# Patient Record
Sex: MALE | Race: WHITE | Employment: FULL TIME | URBAN - METROPOLITAN AREA
[De-identification: names, ages, dates, MRNs, and addresses within clinical notes are randomized per-mention and may not be internally consistent; named-entity substitution may affect disease eponyms.]

---

## 2018-07-13 ENCOUNTER — OFFICE VISIT (OUTPATIENT)
Dept: FAMILY MEDICINE CLINIC | Age: 48
End: 2018-07-13

## 2018-07-13 VITALS
BODY MASS INDEX: 31.06 KG/M2 | RESPIRATION RATE: 16 BRPM | HEART RATE: 78 BPM | WEIGHT: 242 LBS | OXYGEN SATURATION: 97 % | SYSTOLIC BLOOD PRESSURE: 184 MMHG | DIASTOLIC BLOOD PRESSURE: 120 MMHG | HEIGHT: 74 IN

## 2018-07-13 DIAGNOSIS — Z00.00 ANNUAL PHYSICAL EXAM: Primary | ICD-10-CM

## 2018-07-13 DIAGNOSIS — I10 ESSENTIAL HYPERTENSION: Primary | ICD-10-CM

## 2018-07-13 PROCEDURE — 99203 OFFICE O/P NEW LOW 30 MIN: CPT | Performed by: FAMILY MEDICINE

## 2018-07-13 RX ORDER — PREDNISOLONE ACETATE 10 MG/ML
SUSPENSION/ DROPS OPHTHALMIC
COMMUNITY
Start: 2018-06-27 | End: 2019-01-16 | Stop reason: ALTCHOICE

## 2018-07-13 RX ORDER — LISINOPRIL 10 MG/1
10 TABLET ORAL DAILY
Qty: 30 TABLET | Refills: 3 | Status: SHIPPED | OUTPATIENT
Start: 2018-07-13 | End: 2018-09-14 | Stop reason: DRUGHIGH

## 2018-07-13 RX ORDER — BLOOD PRESSURE TEST KIT
1 KIT MISCELLANEOUS 2 TIMES DAILY
Qty: 1 KIT | Refills: 0 | Status: SHIPPED | OUTPATIENT
Start: 2018-07-13 | End: 2018-11-09

## 2018-07-13 ASSESSMENT — PATIENT HEALTH QUESTIONNAIRE - PHQ9
SUM OF ALL RESPONSES TO PHQ9 QUESTIONS 1 & 2: 0
2. FEELING DOWN, DEPRESSED OR HOPELESS: 0
SUM OF ALL RESPONSES TO PHQ QUESTIONS 1-9: 0
1. LITTLE INTEREST OR PLEASURE IN DOING THINGS: 0

## 2018-07-13 NOTE — PROGRESS NOTES
m)   Wt 242 lb (109.8 kg)   SpO2 97%   BMI 31.07 kg/m²     Physical Exam   Constitutional: He is oriented to person, place, and time. He appears well-developed and well-nourished. HENT:   Head: Normocephalic and atraumatic. Eyes: EOM are normal. Pupils are equal, round, and reactive to light. Neck: Normal range of motion. Cardiovascular: Normal rate, regular rhythm and normal heart sounds. Pulmonary/Chest: Effort normal and breath sounds normal.   Abdominal: Bowel sounds are normal. There is no tenderness. Neurological: He is alert and oriented to person, place, and time. Psychiatric: He has a normal mood and affect. His behavior is normal. Judgment and thought content normal.       Plan   Diagnosis Orders   1. Essential hypertension  Blood Pressure KIT    lisinopril (PRINIVIL;ZESTRIL) 10 MG tablet       Return in about 2 months (around 9/13/2018) for Physical Exam.    Pt to record BP twice a day to a log to bring to the office in 2 weeks with his name it. Prior to Visit Medications    Medication Sig Taking?  Authorizing Provider   PRED FORTE 1 % ophthalmic suspension  Yes Historical Provider, MD   Blood Pressure KIT 1 kit by Does not apply route 2 times daily Yes Bruno Thornton,    lisinopril (PRINIVIL;ZESTRIL) 10 MG tablet Take 1 tablet by mouth daily Yes Bruno Thornton, DO

## 2018-08-10 DIAGNOSIS — Z00.00 ANNUAL PHYSICAL EXAM: ICD-10-CM

## 2018-08-10 LAB
A/G RATIO: 2 (ref 1.1–2.2)
ALBUMIN SERPL-MCNC: 4.7 G/DL (ref 3.4–5)
ALP BLD-CCNC: 64 U/L (ref 40–129)
ALT SERPL-CCNC: 45 U/L (ref 10–40)
ANION GAP SERPL CALCULATED.3IONS-SCNC: 11 MMOL/L (ref 3–16)
AST SERPL-CCNC: 37 U/L (ref 15–37)
BASOPHILS ABSOLUTE: 0 K/UL (ref 0–0.2)
BASOPHILS RELATIVE PERCENT: 0.6 %
BILIRUB SERPL-MCNC: 1.2 MG/DL (ref 0–1)
BUN BLDV-MCNC: 12 MG/DL (ref 7–20)
CALCIUM SERPL-MCNC: 9.5 MG/DL (ref 8.3–10.6)
CHLORIDE BLD-SCNC: 104 MMOL/L (ref 99–110)
CHOLESTEROL, TOTAL: 198 MG/DL (ref 0–199)
CO2: 26 MMOL/L (ref 21–32)
CREAT SERPL-MCNC: 1.2 MG/DL (ref 0.9–1.3)
EOSINOPHILS ABSOLUTE: 0.2 K/UL (ref 0–0.6)
EOSINOPHILS RELATIVE PERCENT: 3.5 %
GFR AFRICAN AMERICAN: >60
GFR NON-AFRICAN AMERICAN: >60
GLOBULIN: 2.4 G/DL
GLUCOSE BLD-MCNC: 86 MG/DL (ref 70–99)
HCT VFR BLD CALC: 45 % (ref 40.5–52.5)
HDLC SERPL-MCNC: 37 MG/DL (ref 40–60)
HEMOGLOBIN: 15.2 G/DL (ref 13.5–17.5)
LDL CHOLESTEROL CALCULATED: 119 MG/DL
LYMPHOCYTES ABSOLUTE: 1.4 K/UL (ref 1–5.1)
LYMPHOCYTES RELATIVE PERCENT: 28.5 %
MAGNESIUM: 2.1 MG/DL (ref 1.8–2.4)
MCH RBC QN AUTO: 32.1 PG (ref 26–34)
MCHC RBC AUTO-ENTMCNC: 33.8 G/DL (ref 31–36)
MCV RBC AUTO: 94.9 FL (ref 80–100)
MONOCYTES ABSOLUTE: 0.3 K/UL (ref 0–1.3)
MONOCYTES RELATIVE PERCENT: 7.1 %
NEUTROPHILS ABSOLUTE: 3 K/UL (ref 1.7–7.7)
NEUTROPHILS RELATIVE PERCENT: 60.3 %
PDW BLD-RTO: 13.4 % (ref 12.4–15.4)
PLATELET # BLD: 199 K/UL (ref 135–450)
PMV BLD AUTO: 8.7 FL (ref 5–10.5)
POTASSIUM SERPL-SCNC: 4.8 MMOL/L (ref 3.5–5.1)
RBC # BLD: 4.74 M/UL (ref 4.2–5.9)
SODIUM BLD-SCNC: 141 MMOL/L (ref 136–145)
T3 FREE: 3.4 PG/ML (ref 2.3–4.2)
T4 FREE: 1.3 NG/DL (ref 0.9–1.8)
TOTAL PROTEIN: 7.1 G/DL (ref 6.4–8.2)
TRIGL SERPL-MCNC: 210 MG/DL (ref 0–150)
TSH SERPL DL<=0.05 MIU/L-ACNC: 1 UIU/ML (ref 0.27–4.2)
VITAMIN D 25-HYDROXY: 22.3 NG/ML
VLDLC SERPL CALC-MCNC: 42 MG/DL
WBC # BLD: 4.9 K/UL (ref 4–11)

## 2018-08-30 DIAGNOSIS — E55.9 VITAMIN D DEFICIENCY: Primary | ICD-10-CM

## 2018-08-30 DIAGNOSIS — E78.00 ELEVATED LDL CHOLESTEROL LEVEL: ICD-10-CM

## 2018-08-30 DIAGNOSIS — R74.01 ELEVATED ALT MEASUREMENT: ICD-10-CM

## 2018-08-30 RX ORDER — ERGOCALCIFEROL (VITAMIN D2) 1250 MCG
50000 CAPSULE ORAL WEEKLY
Qty: 4 CAPSULE | Refills: 2 | Status: SHIPPED | OUTPATIENT
Start: 2018-08-30 | End: 2019-01-16 | Stop reason: ALTCHOICE

## 2018-09-14 ENCOUNTER — OFFICE VISIT (OUTPATIENT)
Dept: FAMILY MEDICINE CLINIC | Age: 48
End: 2018-09-14

## 2018-09-14 VITALS
SYSTOLIC BLOOD PRESSURE: 168 MMHG | OXYGEN SATURATION: 97 % | WEIGHT: 251 LBS | BODY MASS INDEX: 32.21 KG/M2 | HEIGHT: 74 IN | HEART RATE: 88 BPM | RESPIRATION RATE: 18 BRPM | DIASTOLIC BLOOD PRESSURE: 118 MMHG

## 2018-09-14 DIAGNOSIS — D22.9 NEVUS: ICD-10-CM

## 2018-09-14 DIAGNOSIS — R74.01 ELEVATED ALT MEASUREMENT: ICD-10-CM

## 2018-09-14 DIAGNOSIS — M54.41 CHRONIC BILATERAL LOW BACK PAIN WITH BILATERAL SCIATICA: ICD-10-CM

## 2018-09-14 DIAGNOSIS — E55.9 VITAMIN D DEFICIENCY: ICD-10-CM

## 2018-09-14 DIAGNOSIS — G89.29 CHRONIC BILATERAL LOW BACK PAIN WITH BILATERAL SCIATICA: ICD-10-CM

## 2018-09-14 DIAGNOSIS — I10 ESSENTIAL HYPERTENSION: ICD-10-CM

## 2018-09-14 DIAGNOSIS — M54.42 CHRONIC BILATERAL LOW BACK PAIN WITH BILATERAL SCIATICA: ICD-10-CM

## 2018-09-14 DIAGNOSIS — Z00.00 ANNUAL PHYSICAL EXAM: Primary | ICD-10-CM

## 2018-09-14 PROCEDURE — 99396 PREV VISIT EST AGE 40-64: CPT | Performed by: FAMILY MEDICINE

## 2018-09-14 RX ORDER — LISINOPRIL 30 MG/1
30 TABLET ORAL DAILY
Qty: 30 TABLET | Refills: 3 | Status: SHIPPED | OUTPATIENT
Start: 2018-09-14 | End: 2018-11-09

## 2018-09-14 ASSESSMENT — ENCOUNTER SYMPTOMS
VOMITING: 0
EYE REDNESS: 0
DIARRHEA: 0
EYE PAIN: 0
SHORTNESS OF BREATH: 0
NAUSEA: 0
RHINORRHEA: 0
SORE THROAT: 0
APNEA: 0
COUGH: 0
COLOR CHANGE: 0
BACK PAIN: 1

## 2018-09-14 ASSESSMENT — PATIENT HEALTH QUESTIONNAIRE - PHQ9
SUM OF ALL RESPONSES TO PHQ QUESTIONS 1-9: 0
SUM OF ALL RESPONSES TO PHQ9 QUESTIONS 1 & 2: 0
SUM OF ALL RESPONSES TO PHQ QUESTIONS 1-9: 0
1. LITTLE INTEREST OR PLEASURE IN DOING THINGS: 0
2. FEELING DOWN, DEPRESSED OR HOPELESS: 0

## 2018-09-14 NOTE — PROGRESS NOTES
Cara Smith  YOB: 1970    Date of Service:  9/14/2018    Chief Complaint:   Cara Smith is a 57 y.o. male who presents for complete physical examination. HPI:  HPI  Average BP outside of the office is 150/110, has stopped adding salt to his foos and has decreased soda significantly. Has been travelling and eating out. Self-testicular exams: Yes  Sexual activity: has sex with females   Last eye exam: 1 month ago, abnormal - iritis, decreased right eye vision  Exercise: walks 7 time(s) per week  Seatbelt use: yes  Are You a spiritual person: Yes  Living will: yes    Wt Readings from Last 3 Encounters:   09/14/18 251 lb (113.9 kg)   07/13/18 242 lb (109.8 kg)     BP Readings from Last 3 Encounters:   09/14/18 (!) 168/118   07/13/18 (!) 184/120       Patient Active Problem List   Diagnosis    Essential hypertension    Vitamin D deficiency       Health Maintenance   Topic Date Due    HIV screen  07/09/1987    DTaP/Tdap/Td vaccine (1 - Tdap) 07/09/1991    Flu vaccine (1) 09/01/2018    Potassium monitoring  08/10/2019    Creatinine monitoring  08/10/2019    Lipid screen  08/10/2023         There is no immunization history on file for this patient.     Allergies   Allergen Reactions    No Known Allergies      Outpatient Prescriptions Marked as Taking for the 9/14/18 encounter (Office Visit) with Torin Yan DO   Medication Sig Dispense Refill    lisinopril (PRINIVIL;ZESTRIL) 30 MG tablet Take 1 tablet by mouth daily 30 tablet 3    ergocalciferol (DRISDOL) 33597 units capsule Take 1 capsule by mouth once a week for 12 doses 4 capsule 2    Blood Pressure KIT 1 kit by Does not apply route 2 times daily 1 kit 0       Past Medical History:   Diagnosis Date    Allergic rhinitis     Chronic back pain     Hypertension     Osteoarthritis      Past Surgical History:   Procedure Laterality Date    EYE SURGERY      VASECTOMY  2013     Family History   Problem Relation Age of Onset    High Blood Pressure Mother     Other Father      Social History     Social History    Marital status:      Spouse name: N/A    Number of children: N/A    Years of education: N/A     Occupational History    Not on file. Social History Main Topics    Smoking status: Former Smoker     Packs/day: 1.00     Years: 20.00     Types: Cigarettes     Quit date: 2005    Smokeless tobacco: Never Used    Alcohol use Yes      Comment: 10 beers    Drug use: No    Sexual activity: Yes     Partners: Female     Other Topics Concern    Not on file     Social History Narrative    No narrative on file       Review of Systems:  Review of Systems   Constitutional: Positive for fatigue. Negative for activity change and appetite change. HENT: Positive for congestion. Negative for rhinorrhea and sore throat. Eyes: Negative for pain and redness. Respiratory: Negative for apnea, cough and shortness of breath. Cardiovascular: Negative for chest pain and palpitations. Gastrointestinal: Negative for diarrhea (last week - resolved), nausea and vomiting. Endocrine: Negative for cold intolerance and heat intolerance. Genitourinary: Negative for difficulty urinating, frequency and hematuria. Musculoskeletal: Positive for arthralgias, back pain, myalgias and neck pain. Skin: Negative for color change and rash. Allergic/Immunologic: Positive for environmental allergies. Neurological: Negative for dizziness and headaches. Hematological: Negative for adenopathy. Does not bruise/bleed easily. Psychiatric/Behavioral: Negative for dysphoric mood and sleep disturbance. The patient is not nervous/anxious.         Physical Exam:   Vitals:    09/14/18 0840 09/14/18 0853   BP: (!) 188/120 (!) 168/118   Site: Right Upper Arm Left Upper Arm   Position: Sitting Sitting   Cuff Size: Medium Adult Medium Adult   Pulse: 88    Resp: 18    SpO2: 97%    Weight: 251 lb (113.9 kg)    Height: 6' 2\" (1.88 m)      Body mass index is

## 2018-11-09 ENCOUNTER — OFFICE VISIT (OUTPATIENT)
Dept: FAMILY MEDICINE CLINIC | Age: 48
End: 2018-11-09
Payer: COMMERCIAL

## 2018-11-09 VITALS
DIASTOLIC BLOOD PRESSURE: 110 MMHG | OXYGEN SATURATION: 98 % | HEART RATE: 88 BPM | RESPIRATION RATE: 16 BRPM | WEIGHT: 259 LBS | SYSTOLIC BLOOD PRESSURE: 168 MMHG | TEMPERATURE: 98.1 F | HEIGHT: 74 IN | BODY MASS INDEX: 33.24 KG/M2

## 2018-11-09 DIAGNOSIS — I10 ESSENTIAL HYPERTENSION: Primary | ICD-10-CM

## 2018-11-09 PROCEDURE — 99213 OFFICE O/P EST LOW 20 MIN: CPT | Performed by: FAMILY MEDICINE

## 2018-11-09 RX ORDER — POTASSIUM CHLORIDE 750 MG/1
10 TABLET, FILM COATED, EXTENDED RELEASE ORAL DAILY
Qty: 60 TABLET | Refills: 3 | Status: SHIPPED | OUTPATIENT
Start: 2018-11-09 | End: 2018-12-14 | Stop reason: SDUPTHER

## 2018-11-09 RX ORDER — LISINOPRIL AND HYDROCHLOROTHIAZIDE 20; 12.5 MG/1; MG/1
1 TABLET ORAL DAILY
Qty: 30 TABLET | Refills: 3 | Status: SHIPPED | OUTPATIENT
Start: 2018-11-09 | End: 2018-12-14 | Stop reason: DRUGHIGH

## 2018-11-09 ASSESSMENT — PATIENT HEALTH QUESTIONNAIRE - PHQ9
2. FEELING DOWN, DEPRESSED OR HOPELESS: 0
SUM OF ALL RESPONSES TO PHQ9 QUESTIONS 1 & 2: 0
1. LITTLE INTEREST OR PLEASURE IN DOING THINGS: 0
SUM OF ALL RESPONSES TO PHQ QUESTIONS 1-9: 0
SUM OF ALL RESPONSES TO PHQ QUESTIONS 1-9: 0

## 2018-12-14 ENCOUNTER — OFFICE VISIT (OUTPATIENT)
Dept: FAMILY MEDICINE CLINIC | Age: 48
End: 2018-12-14
Payer: COMMERCIAL

## 2018-12-14 VITALS
HEART RATE: 77 BPM | DIASTOLIC BLOOD PRESSURE: 104 MMHG | SYSTOLIC BLOOD PRESSURE: 144 MMHG | HEIGHT: 74 IN | BODY MASS INDEX: 33.24 KG/M2 | WEIGHT: 259 LBS | OXYGEN SATURATION: 98 %

## 2018-12-14 DIAGNOSIS — R25.2 LEG CRAMPING: ICD-10-CM

## 2018-12-14 DIAGNOSIS — I10 ESSENTIAL HYPERTENSION: Primary | ICD-10-CM

## 2018-12-14 PROCEDURE — 99213 OFFICE O/P EST LOW 20 MIN: CPT | Performed by: FAMILY MEDICINE

## 2018-12-14 RX ORDER — LISINOPRIL AND HYDROCHLOROTHIAZIDE 25; 20 MG/1; MG/1
1 TABLET ORAL DAILY
Qty: 30 TABLET | Refills: 0 | Status: SHIPPED | OUTPATIENT
Start: 2018-12-14 | End: 2019-01-16 | Stop reason: SDUPTHER

## 2018-12-14 RX ORDER — POTASSIUM CHLORIDE 750 MG/1
10 TABLET, FILM COATED, EXTENDED RELEASE ORAL 2 TIMES DAILY
Qty: 60 TABLET | Refills: 3 | Status: SHIPPED | OUTPATIENT
Start: 2018-12-14 | End: 2019-01-16 | Stop reason: SDUPTHER

## 2018-12-14 NOTE — PROGRESS NOTES
Component Date Value Ref Range Status    TSH 08/10/2018 1.00  0.27 - 4.20 uIU/mL Final    T4 Free 08/10/2018 1.3  0.9 - 1.8 ng/dL Final    T3, Free 08/10/2018 3.4  2.3 - 4.2 pg/mL Final    Cholesterol, Total 08/10/2018 198  0 - 199 mg/dL Final    Triglycerides 08/10/2018 210* 0 - 150 mg/dL Final    HDL 08/10/2018 37* 40 - 60 mg/dL Final    LDL Calculated 08/10/2018 119* <100 mg/dL Final    VLDL Cholesterol Calculated 08/10/2018 42  Not Established mg/dL Final    WBC 08/10/2018 4.9  4.0 - 11.0 K/uL Final    RBC 08/10/2018 4.74  4.20 - 5.90 M/uL Final    Hemoglobin 08/10/2018 15.2  13.5 - 17.5 g/dL Final    Hematocrit 08/10/2018 45.0  40.5 - 52.5 % Final    MCV 08/10/2018 94.9  80.0 - 100.0 fL Final    MCH 08/10/2018 32.1  26.0 - 34.0 pg Final    MCHC 08/10/2018 33.8  31.0 - 36.0 g/dL Final    RDW 08/10/2018 13.4  12.4 - 15.4 % Final    Platelets 17/43/6818 199  135 - 450 K/uL Final    MPV 08/10/2018 8.7  5.0 - 10.5 fL Final    Neutrophils % 08/10/2018 60.3  % Final    Lymphocytes % 08/10/2018 28.5  % Final    Monocytes % 08/10/2018 7.1  % Final    Eosinophils % 08/10/2018 3.5  % Final    Basophils % 08/10/2018 0.6  % Final    Neutrophils # 08/10/2018 3.0  1.7 - 7.7 K/uL Final    Lymphocytes # 08/10/2018 1.4  1.0 - 5.1 K/uL Final    Monocytes # 08/10/2018 0.3  0.0 - 1.3 K/uL Final    Eosinophils # 08/10/2018 0.2  0.0 - 0.6 K/uL Final    Basophils # 08/10/2018 0.0  0.0 - 0.2 K/uL Final    Sodium 08/10/2018 141  136 - 145 mmol/L Final    Potassium 08/10/2018 4.8  3.5 - 5.1 mmol/L Final    Chloride 08/10/2018 104  99 - 110 mmol/L Final    CO2 08/10/2018 26  21 - 32 mmol/L Final    Anion Gap 08/10/2018 11  3 - 16 Final    Glucose 08/10/2018 86  70 - 99 mg/dL Final    BUN 08/10/2018 12  7 - 20 mg/dL Final    CREATININE 08/10/2018 1.2  0.9 - 1.3 mg/dL Final    GFR Non- 08/10/2018 >60  >60 Final    Comment: >60 mL/min/1.73m2 EGFR, calc. for ages 25 and older using hypertension  potassium chloride (KLOR-CON 10) 10 MEQ extended release tablet    lisinopril-hydrochlorothiazide (PRINZIDE;ZESTORETIC) 20-25 MG per tablet    Potassium   2. Leg cramping  Increase potassium to 20 meq       Return in about 1 month (around 1/14/2019) for HTN F/U. Prior to Visit Medications    Medication Sig Taking?  Authorizing Provider   potassium chloride (KLOR-CON 10) 10 MEQ extended release tablet Take 1 tablet by mouth 2 times daily Yes Lisa Mon DO   lisinopril-hydrochlorothiazide (PRINZIDE;ZESTORETIC) 20-25 MG per tablet Take 1 tablet by mouth daily Yes Lisa Mon,    PRED FORTE 1 % ophthalmic suspension  Yes Historical Provider, MD   ergocalciferol (DRISDOL) 62544 units capsule Take 1 capsule by mouth once a week for 12 doses  Lisa Mon,

## 2019-01-16 ENCOUNTER — OFFICE VISIT (OUTPATIENT)
Dept: FAMILY MEDICINE CLINIC | Age: 49
End: 2019-01-16
Payer: COMMERCIAL

## 2019-01-16 VITALS
OXYGEN SATURATION: 97 % | DIASTOLIC BLOOD PRESSURE: 100 MMHG | WEIGHT: 255 LBS | RESPIRATION RATE: 16 BRPM | BODY MASS INDEX: 32.73 KG/M2 | SYSTOLIC BLOOD PRESSURE: 132 MMHG | HEIGHT: 74 IN | HEART RATE: 85 BPM

## 2019-01-16 DIAGNOSIS — I10 ESSENTIAL HYPERTENSION: Primary | ICD-10-CM

## 2019-01-16 DIAGNOSIS — I10 ESSENTIAL HYPERTENSION: ICD-10-CM

## 2019-01-16 DIAGNOSIS — R20.0 BILATERAL FINGER NUMBNESS: ICD-10-CM

## 2019-01-16 PROCEDURE — 99213 OFFICE O/P EST LOW 20 MIN: CPT | Performed by: FAMILY MEDICINE

## 2019-01-16 RX ORDER — AMLODIPINE BESYLATE 5 MG/1
5 TABLET ORAL DAILY
Qty: 30 TABLET | Refills: 0 | Status: SHIPPED | OUTPATIENT
Start: 2019-01-16 | End: 2019-02-16 | Stop reason: SDUPTHER

## 2019-01-16 RX ORDER — LISINOPRIL AND HYDROCHLOROTHIAZIDE 25; 20 MG/1; MG/1
1 TABLET ORAL DAILY
Qty: 30 TABLET | Refills: 1 | Status: SHIPPED | OUTPATIENT
Start: 2019-01-16 | End: 2019-03-21 | Stop reason: SDUPTHER

## 2019-01-16 RX ORDER — POTASSIUM CHLORIDE 750 MG/1
10 TABLET, FILM COATED, EXTENDED RELEASE ORAL 2 TIMES DAILY
Qty: 60 TABLET | Refills: 3 | Status: SHIPPED | OUTPATIENT
Start: 2019-01-16 | End: 2019-07-29 | Stop reason: SDUPTHER

## 2019-01-16 ASSESSMENT — PATIENT HEALTH QUESTIONNAIRE - PHQ9
SUM OF ALL RESPONSES TO PHQ9 QUESTIONS 1 & 2: 0
SUM OF ALL RESPONSES TO PHQ QUESTIONS 1-9: 0
2. FEELING DOWN, DEPRESSED OR HOPELESS: 0
SUM OF ALL RESPONSES TO PHQ QUESTIONS 1-9: 0
1. LITTLE INTEREST OR PLEASURE IN DOING THINGS: 0

## 2019-02-20 ENCOUNTER — OFFICE VISIT (OUTPATIENT)
Dept: FAMILY MEDICINE CLINIC | Age: 49
End: 2019-02-20
Payer: COMMERCIAL

## 2019-02-20 VITALS
HEART RATE: 83 BPM | OXYGEN SATURATION: 96 % | WEIGHT: 254 LBS | SYSTOLIC BLOOD PRESSURE: 138 MMHG | RESPIRATION RATE: 14 BRPM | HEIGHT: 74 IN | BODY MASS INDEX: 32.6 KG/M2 | DIASTOLIC BLOOD PRESSURE: 86 MMHG

## 2019-02-20 DIAGNOSIS — I10 ESSENTIAL HYPERTENSION: Primary | ICD-10-CM

## 2019-02-20 DIAGNOSIS — R20.0 BILATERAL FINGER NUMBNESS: ICD-10-CM

## 2019-02-20 DIAGNOSIS — I10 ESSENTIAL HYPERTENSION: ICD-10-CM

## 2019-02-20 LAB
ANION GAP SERPL CALCULATED.3IONS-SCNC: 12 MMOL/L (ref 3–16)
BUN BLDV-MCNC: 17 MG/DL (ref 7–20)
CALCIUM SERPL-MCNC: 10.1 MG/DL (ref 8.3–10.6)
CHLORIDE BLD-SCNC: 100 MMOL/L (ref 99–110)
CO2: 29 MMOL/L (ref 21–32)
CREAT SERPL-MCNC: 1.1 MG/DL (ref 0.9–1.3)
GFR AFRICAN AMERICAN: >60
GFR NON-AFRICAN AMERICAN: >60
GLUCOSE BLD-MCNC: 87 MG/DL (ref 70–99)
POTASSIUM SERPL-SCNC: 4.5 MMOL/L (ref 3.5–5.1)
SODIUM BLD-SCNC: 141 MMOL/L (ref 136–145)

## 2019-02-20 PROCEDURE — 99213 OFFICE O/P EST LOW 20 MIN: CPT | Performed by: FAMILY MEDICINE

## 2019-02-20 ASSESSMENT — PATIENT HEALTH QUESTIONNAIRE - PHQ9
SUM OF ALL RESPONSES TO PHQ9 QUESTIONS 1 & 2: 0
SUM OF ALL RESPONSES TO PHQ QUESTIONS 1-9: 0
SUM OF ALL RESPONSES TO PHQ QUESTIONS 1-9: 0
1. LITTLE INTEREST OR PLEASURE IN DOING THINGS: 0
2. FEELING DOWN, DEPRESSED OR HOPELESS: 0

## 2019-02-20 ASSESSMENT — ENCOUNTER SYMPTOMS: SHORTNESS OF BREATH: 0

## 2019-05-21 ENCOUNTER — OFFICE VISIT (OUTPATIENT)
Dept: FAMILY MEDICINE CLINIC | Age: 49
End: 2019-05-21
Payer: COMMERCIAL

## 2019-05-21 VITALS
DIASTOLIC BLOOD PRESSURE: 106 MMHG | BODY MASS INDEX: 32.6 KG/M2 | TEMPERATURE: 97.7 F | OXYGEN SATURATION: 97 % | HEIGHT: 74 IN | RESPIRATION RATE: 16 BRPM | WEIGHT: 254 LBS | HEART RATE: 73 BPM | SYSTOLIC BLOOD PRESSURE: 146 MMHG

## 2019-05-21 DIAGNOSIS — I10 ESSENTIAL HYPERTENSION: Primary | ICD-10-CM

## 2019-05-21 DIAGNOSIS — Z23 NEED FOR PROPHYLACTIC VACCINATION AGAINST DIPHTHERIA-TETANUS-PERTUSSIS (DTP): ICD-10-CM

## 2019-05-21 PROCEDURE — 90715 TDAP VACCINE 7 YRS/> IM: CPT | Performed by: FAMILY MEDICINE

## 2019-05-21 PROCEDURE — 90471 IMMUNIZATION ADMIN: CPT | Performed by: FAMILY MEDICINE

## 2019-05-21 PROCEDURE — 99213 OFFICE O/P EST LOW 20 MIN: CPT | Performed by: FAMILY MEDICINE

## 2019-05-21 RX ORDER — AMLODIPINE BESYLATE 10 MG/1
10 TABLET ORAL DAILY
Qty: 30 TABLET | Refills: 2 | Status: SHIPPED | OUTPATIENT
Start: 2019-05-21 | End: 2019-08-20 | Stop reason: DRUGHIGH

## 2019-05-21 ASSESSMENT — PATIENT HEALTH QUESTIONNAIRE - PHQ9
SUM OF ALL RESPONSES TO PHQ9 QUESTIONS 1 & 2: 0
SUM OF ALL RESPONSES TO PHQ QUESTIONS 1-9: 0
1. LITTLE INTEREST OR PLEASURE IN DOING THINGS: 0
SUM OF ALL RESPONSES TO PHQ QUESTIONS 1-9: 0
2. FEELING DOWN, DEPRESSED OR HOPELESS: 0

## 2019-05-21 NOTE — PROGRESS NOTES
2019    This is a 57 y.o. male   Chief Complaint   Patient presents with    Hypertension     F/U   .    HPI  Pt presents today for a HTN F/U. He is currently taking lisinopril hydrochlorothiazide 20-12.5 mg as well as Amlodipine 5 mg. States that his BP readings at home have been steadily increasing, average 135/95. Weight still 254, states that he has been more active. Still having periods of dizziness if he gets up too fast.  Denies leg cramps after starting a multivitamin.    Past Medical History:   Diagnosis Date    Allergic rhinitis     Chronic back pain     Hypertension     Osteoarthritis        Past Surgical History:   Procedure Laterality Date    EYE SURGERY      VASECTOMY  2013       Social History     Socioeconomic History    Marital status:      Spouse name: Not on file    Number of children: Not on file    Years of education: Not on file    Highest education level: Not on file   Occupational History    Not on file   Social Needs    Financial resource strain: Not on file    Food insecurity:     Worry: Not on file     Inability: Not on file    Transportation needs:     Medical: Not on file     Non-medical: Not on file   Tobacco Use    Smoking status: Former Smoker     Packs/day: 1.00     Years: 20.00     Pack years: 20.00     Types: Cigarettes     Last attempt to quit:      Years since quittin.3    Smokeless tobacco: Never Used   Substance and Sexual Activity    Alcohol use: Yes     Comment: 10 beers    Drug use: No    Sexual activity: Yes     Partners: Female   Lifestyle    Physical activity:     Days per week: Not on file     Minutes per session: Not on file    Stress: Not on file   Relationships    Social connections:     Talks on phone: Not on file     Gets together: Not on file     Attends Restorationist service: Not on file     Active member of club or organization: Not on file     Attends meetings of clubs or organizations: Not on file     Relationship status: Not on file    Intimate partner violence:     Fear of current or ex partner: Not on file     Emotionally abused: Not on file     Physically abused: Not on file     Forced sexual activity: Not on file   Other Topics Concern    Not on file   Social History Narrative    Not on file       Family History   Problem Relation Age of Onset    High Blood Pressure Mother     Other Father        Current Outpatient Medications   Medication Sig Dispense Refill    amLODIPine (NORVASC) 10 MG tablet Take 1 tablet by mouth daily 30 tablet 2    lisinopril-hydrochlorothiazide (PRINZIDE;ZESTORETIC) 20-25 MG per tablet TAKE 1 TABLET BY MOUTH DAILY 30 tablet 3    potassium chloride (KLOR-CON 10) 10 MEQ extended release tablet Take 1 tablet by mouth 2 times daily 60 tablet 3     No current facility-administered medications for this visit. There is no immunization history for the selected administration types on file for this patient. Allergies   Allergen Reactions    No Known Allergies        Orders Only on 02/20/2019   Component Date Value Ref Range Status    Sodium 02/20/2019 141  136 - 145 mmol/L Final    Potassium 02/20/2019 4.5  3.5 - 5.1 mmol/L Final    Chloride 02/20/2019 100  99 - 110 mmol/L Final    CO2 02/20/2019 29  21 - 32 mmol/L Final    Anion Gap 02/20/2019 12  3 - 16 Final    Glucose 02/20/2019 87  70 - 99 mg/dL Final    BUN 02/20/2019 17  7 - 20 mg/dL Final    CREATININE 02/20/2019 1.1  0.9 - 1.3 mg/dL Final    GFR Non- 02/20/2019 >60  >60 Final    Comment: >60 mL/min/1.73m2 EGFR, calc. for ages 25 and older using the  MDRD formula (not corrected for weight), is valid for stable  renal function.  GFR  02/20/2019 >60  >60 Final    Comment: Chronic Kidney Disease: less than 60 ml/min/1.73 sq.m. Kidney Failure: less than 15 ml/min/1.73 sq.m. Results valid for patients 18 years and older.       Calcium 02/20/2019 10.1  8.3 - 10.6 mg/dL Final Review of Systems   Neurological: Positive for dizziness (when standing too fast). Negative leg cramping       BP (!) 146/106 (Site: Right Upper Arm, Position: Sitting, Cuff Size: Medium Adult)   Pulse 73   Temp 97.7 °F (36.5 °C) (Oral)   Resp 16   Ht 6' 2\" (1.88 m)   Wt 254 lb (115.2 kg)   SpO2 97%   BMI 32.61 kg/m²     Physical Exam   Constitutional: He is oriented to person, place, and time. He appears well-developed and well-nourished. HENT:   Head: Normocephalic and atraumatic. Eyes: Pupils are equal, round, and reactive to light. EOM are normal.   Neck: Normal range of motion. Cardiovascular: Normal rate, regular rhythm and normal heart sounds. Pulmonary/Chest: Effort normal and breath sounds normal.   Abdominal: Bowel sounds are normal. There is no tenderness. Neurological: He is alert and oriented to person, place, and time. Psychiatric: He has a normal mood and affect. His behavior is normal. Judgment and thought content normal.   Vitals reviewed. Plan   Diagnosis Orders   1. Essential hypertension  Increased Amlodipine to 10 mg   2. Need for prophylactic vaccination against diphtheria-tetanus-pertussis (DTP)  Tdap (age 6y and older) IM (Boostrix)     Pt will check BP BID and send in log via [facility] in 2 weeks. Return in about 3 months (around 8/21/2019) for HTN F/U. Prior to Visit Medications    Medication Sig Taking?  Authorizing Provider   amLODIPine (NORVASC) 10 MG tablet Take 1 tablet by mouth daily Yes Theola Free, DO   lisinopril-hydrochlorothiazide (PRINZIDE;ZESTORETIC) 20-25 MG per tablet TAKE 1 TABLET BY MOUTH DAILY Yes Theola Free, DO   potassium chloride (KLOR-CON 10) 10 MEQ extended release tablet Take 1 tablet by mouth 2 times daily Yes Theola Free, DO

## 2019-07-26 DIAGNOSIS — I10 ESSENTIAL HYPERTENSION: ICD-10-CM

## 2019-07-29 RX ORDER — POTASSIUM CHLORIDE 750 MG/1
10 TABLET, FILM COATED, EXTENDED RELEASE ORAL DAILY
Qty: 60 TABLET | Refills: 0 | Status: SHIPPED | OUTPATIENT
Start: 2019-07-29 | End: 2019-11-03 | Stop reason: SDUPTHER

## 2019-07-29 RX ORDER — LISINOPRIL AND HYDROCHLOROTHIAZIDE 25; 20 MG/1; MG/1
1 TABLET ORAL DAILY
Qty: 30 TABLET | Refills: 0 | Status: SHIPPED | OUTPATIENT
Start: 2019-07-29 | End: 2019-08-28 | Stop reason: SDUPTHER

## 2019-07-29 NOTE — TELEPHONE ENCOUNTER
Please let patient know that I refilled his medication and that I would like for him to have the labs done that were ordered in September done before his next visit. Thank you!

## 2019-08-20 ENCOUNTER — OFFICE VISIT (OUTPATIENT)
Dept: FAMILY MEDICINE CLINIC | Age: 49
End: 2019-08-20
Payer: COMMERCIAL

## 2019-08-20 VITALS
WEIGHT: 253 LBS | DIASTOLIC BLOOD PRESSURE: 84 MMHG | HEIGHT: 74 IN | SYSTOLIC BLOOD PRESSURE: 124 MMHG | HEART RATE: 78 BPM | OXYGEN SATURATION: 96 % | RESPIRATION RATE: 14 BRPM | BODY MASS INDEX: 32.47 KG/M2

## 2019-08-20 DIAGNOSIS — I10 ESSENTIAL HYPERTENSION: Primary | ICD-10-CM

## 2019-08-20 PROCEDURE — 99213 OFFICE O/P EST LOW 20 MIN: CPT | Performed by: FAMILY MEDICINE

## 2019-08-20 RX ORDER — AMLODIPINE BESYLATE 5 MG/1
7.5 TABLET ORAL DAILY
Qty: 45 TABLET | Refills: 0 | Status: SHIPPED | OUTPATIENT
Start: 2019-08-20 | End: 2019-10-02 | Stop reason: SDUPTHER

## 2019-08-20 ASSESSMENT — PATIENT HEALTH QUESTIONNAIRE - PHQ9
SUM OF ALL RESPONSES TO PHQ9 QUESTIONS 1 & 2: 0
SUM OF ALL RESPONSES TO PHQ QUESTIONS 1-9: 0
1. LITTLE INTEREST OR PLEASURE IN DOING THINGS: 0
2. FEELING DOWN, DEPRESSED OR HOPELESS: 0
SUM OF ALL RESPONSES TO PHQ QUESTIONS 1-9: 0

## 2019-08-20 NOTE — PROGRESS NOTES
Minutes per session: Not on file    Stress: Not on file   Relationships    Social connections:     Talks on phone: Not on file     Gets together: Not on file     Attends Yazdanism service: Not on file     Active member of club or organization: Not on file     Attends meetings of clubs or organizations: Not on file     Relationship status: Not on file    Intimate partner violence:     Fear of current or ex partner: Not on file     Emotionally abused: Not on file     Physically abused: Not on file     Forced sexual activity: Not on file   Other Topics Concern    Not on file   Social History Narrative    Not on file       Family History   Problem Relation Age of Onset    High Blood Pressure Mother     Other Father        Current Outpatient Medications   Medication Sig Dispense Refill    amLODIPine (NORVASC) 5 MG tablet Take 1.5 tablets by mouth daily 45 tablet 0    lisinopril-hydrochlorothiazide (PRINZIDE;ZESTORETIC) 20-25 MG per tablet TAKE 1 TABLET BY MOUTH DAILY 30 tablet 0    potassium chloride (KLOR-CON) 10 MEQ extended release tablet TAKE 1 TABLET BY MOUTH DAILY 60 tablet 0     No current facility-administered medications for this visit.         Immunization History   Administered Date(s) Administered    Tdap (Boostrix, Adacel) 05/21/2019       Allergies   Allergen Reactions    No Known Allergies        Orders Only on 02/20/2019   Component Date Value Ref Range Status    Sodium 02/20/2019 141  136 - 145 mmol/L Final    Potassium 02/20/2019 4.5  3.5 - 5.1 mmol/L Final    Chloride 02/20/2019 100  99 - 110 mmol/L Final    CO2 02/20/2019 29  21 - 32 mmol/L Final    Anion Gap 02/20/2019 12  3 - 16 Final    Glucose 02/20/2019 87  70 - 99 mg/dL Final    BUN 02/20/2019 17  7 - 20 mg/dL Final    CREATININE 02/20/2019 1.1  0.9 - 1.3 mg/dL Final    GFR Non- 02/20/2019 >60  >60 Final    Comment: >60 mL/min/1.73m2 EGFR, calc. for ages 25 and older using the  MDRD formula (not corrected for

## 2019-09-19 ENCOUNTER — OFFICE VISIT (OUTPATIENT)
Dept: FAMILY MEDICINE CLINIC | Age: 49
End: 2019-09-19
Payer: COMMERCIAL

## 2019-09-19 VITALS
HEIGHT: 74 IN | OXYGEN SATURATION: 98 % | DIASTOLIC BLOOD PRESSURE: 84 MMHG | HEART RATE: 69 BPM | BODY MASS INDEX: 32.34 KG/M2 | SYSTOLIC BLOOD PRESSURE: 130 MMHG | RESPIRATION RATE: 14 BRPM | WEIGHT: 252 LBS

## 2019-09-19 DIAGNOSIS — Z00.00 ANNUAL PHYSICAL EXAM: Primary | ICD-10-CM

## 2019-09-19 DIAGNOSIS — I10 ESSENTIAL HYPERTENSION: ICD-10-CM

## 2019-09-19 PROCEDURE — 99213 OFFICE O/P EST LOW 20 MIN: CPT | Performed by: FAMILY MEDICINE

## 2019-09-19 ASSESSMENT — PATIENT HEALTH QUESTIONNAIRE - PHQ9
1. LITTLE INTEREST OR PLEASURE IN DOING THINGS: 0
SUM OF ALL RESPONSES TO PHQ QUESTIONS 1-9: 0
SUM OF ALL RESPONSES TO PHQ9 QUESTIONS 1 & 2: 0
2. FEELING DOWN, DEPRESSED OR HOPELESS: 0
SUM OF ALL RESPONSES TO PHQ QUESTIONS 1-9: 0

## 2019-10-02 DIAGNOSIS — I10 ESSENTIAL HYPERTENSION: ICD-10-CM

## 2019-10-02 RX ORDER — AMLODIPINE BESYLATE 5 MG/1
TABLET ORAL
Qty: 45 TABLET | Refills: 3 | Status: SHIPPED | OUTPATIENT
Start: 2019-10-02 | End: 2020-02-12

## 2019-10-02 RX ORDER — LISINOPRIL AND HYDROCHLOROTHIAZIDE 25; 20 MG/1; MG/1
TABLET ORAL
Qty: 30 TABLET | Refills: 3 | Status: SHIPPED | OUTPATIENT
Start: 2019-10-02 | End: 2020-02-12

## 2019-11-03 DIAGNOSIS — I10 ESSENTIAL HYPERTENSION: ICD-10-CM

## 2019-11-04 RX ORDER — POTASSIUM CHLORIDE 750 MG/1
TABLET, FILM COATED, EXTENDED RELEASE ORAL
Qty: 30 TABLET | Refills: 2 | Status: SHIPPED | OUTPATIENT
Start: 2019-11-04 | End: 2020-03-04

## 2019-12-19 ENCOUNTER — OFFICE VISIT (OUTPATIENT)
Dept: FAMILY MEDICINE CLINIC | Age: 49
End: 2019-12-19
Payer: COMMERCIAL

## 2019-12-19 VITALS
TEMPERATURE: 97.3 F | HEIGHT: 74 IN | DIASTOLIC BLOOD PRESSURE: 82 MMHG | RESPIRATION RATE: 16 BRPM | OXYGEN SATURATION: 99 % | HEART RATE: 75 BPM | BODY MASS INDEX: 33.11 KG/M2 | SYSTOLIC BLOOD PRESSURE: 133 MMHG | WEIGHT: 258 LBS

## 2019-12-19 DIAGNOSIS — G89.29 CHRONIC LEFT SHOULDER PAIN: ICD-10-CM

## 2019-12-19 DIAGNOSIS — Z00.00 ANNUAL PHYSICAL EXAM: Primary | ICD-10-CM

## 2019-12-19 DIAGNOSIS — R12 HEARTBURN: ICD-10-CM

## 2019-12-19 DIAGNOSIS — M25.512 CHRONIC LEFT SHOULDER PAIN: ICD-10-CM

## 2019-12-19 DIAGNOSIS — M25.531 WRIST PAIN, RIGHT: ICD-10-CM

## 2019-12-19 DIAGNOSIS — I10 ESSENTIAL HYPERTENSION: ICD-10-CM

## 2019-12-19 PROCEDURE — 99396 PREV VISIT EST AGE 40-64: CPT | Performed by: FAMILY MEDICINE

## 2019-12-19 ASSESSMENT — ENCOUNTER SYMPTOMS
ABDOMINAL PAIN: 0
SHORTNESS OF BREATH: 0
BACK PAIN: 0
EYE ITCHING: 0
TROUBLE SWALLOWING: 0
EYE PAIN: 0

## 2019-12-19 ASSESSMENT — PATIENT HEALTH QUESTIONNAIRE - PHQ9
SUM OF ALL RESPONSES TO PHQ QUESTIONS 1-9: 0
2. FEELING DOWN, DEPRESSED OR HOPELESS: 0
1. LITTLE INTEREST OR PLEASURE IN DOING THINGS: 0
SUM OF ALL RESPONSES TO PHQ9 QUESTIONS 1 & 2: 0
SUM OF ALL RESPONSES TO PHQ QUESTIONS 1-9: 0

## 2020-02-12 RX ORDER — LISINOPRIL AND HYDROCHLOROTHIAZIDE 25; 20 MG/1; MG/1
TABLET ORAL
Qty: 90 TABLET | Refills: 1 | Status: SHIPPED | OUTPATIENT
Start: 2020-02-12 | End: 2020-08-03

## 2020-02-12 RX ORDER — AMLODIPINE BESYLATE 5 MG/1
TABLET ORAL
Qty: 135 TABLET | Refills: 1 | Status: SHIPPED | OUTPATIENT
Start: 2020-02-12 | End: 2020-06-29

## 2020-03-04 RX ORDER — POTASSIUM CHLORIDE 750 MG/1
TABLET, FILM COATED, EXTENDED RELEASE ORAL
Qty: 30 TABLET | Refills: 2 | Status: SHIPPED | OUTPATIENT
Start: 2020-03-04 | End: 2020-06-03

## 2020-06-03 RX ORDER — POTASSIUM CHLORIDE 750 MG/1
TABLET, FILM COATED, EXTENDED RELEASE ORAL
Qty: 30 TABLET | Refills: 2 | Status: SHIPPED | OUTPATIENT
Start: 2020-06-03 | End: 2020-08-19 | Stop reason: SDUPTHER

## 2020-06-03 NOTE — TELEPHONE ENCOUNTER
Please let pt know that I refilled his medication and that I'd like for him to have his labs done to check, amongst other things, his potassium level.  Thank you

## 2020-08-03 RX ORDER — LISINOPRIL AND HYDROCHLOROTHIAZIDE 25; 20 MG/1; MG/1
TABLET ORAL
Qty: 30 TABLET | Refills: 5 | Status: SHIPPED | OUTPATIENT
Start: 2020-08-03 | End: 2020-08-19 | Stop reason: SDUPTHER

## 2020-08-19 RX ORDER — LISINOPRIL AND HYDROCHLOROTHIAZIDE 25; 20 MG/1; MG/1
TABLET ORAL
Qty: 90 TABLET | Refills: 1 | Status: SHIPPED | OUTPATIENT
Start: 2020-08-19 | Stop reason: SDUPTHER

## 2020-08-19 RX ORDER — POTASSIUM CHLORIDE 750 MG/1
TABLET, FILM COATED, EXTENDED RELEASE ORAL
Qty: 90 TABLET | Refills: 1 | Status: SHIPPED | OUTPATIENT
Start: 2020-08-19 | End: 2020-09-18

## 2020-08-19 RX ORDER — AMLODIPINE BESYLATE 5 MG/1
TABLET ORAL
Qty: 135 TABLET | Refills: 1 | Status: SHIPPED | OUTPATIENT
Start: 2020-08-19 | Stop reason: SDUPTHER

## 2020-09-18 RX ORDER — POTASSIUM CHLORIDE 750 MG/1
TABLET, FILM COATED, EXTENDED RELEASE ORAL
Qty: 30 TABLET | Refills: 2 | Status: SHIPPED | OUTPATIENT
Start: 2020-09-18 | Stop reason: SDUPTHER

## 2020-11-12 ENCOUNTER — TELEPHONE (OUTPATIENT)
Dept: FAMILY MEDICINE CLINIC | Age: 50
End: 2020-11-12

## 2020-11-12 NOTE — TELEPHONE ENCOUNTER
Patient stated that he was tested positive for COVIDa  And needs a notes for work to say that he is ok to come back to work was tested positive 11/2/20 never had a fever has not been tested for negative result stated that his employer they either need a note or a second test

## 2020-11-15 NOTE — TELEPHONE ENCOUNTER
[place] let pt know the following: If he has/had symptoms:   Isolate until all three of these things are true: 1) your symptoms are better, 2) it has been 10 days since you first felt sick, and 3) you have had no fever for at least 24 hours without using medicine that lowers fever. If he did not have symptoms:   Stay home for 10 days after the date you were tested. If you develop symptoms during those 10 days, stay home until all three of these things are true: 1) your symptoms are better, 2) it has been 10 days since you first felt sick, and 3) you have had no fever for at least 24 hours without using medicine that lowers fever. He states he had no fever but doesn't mention if he had any other symptoms. I can write the note if he has satisfied these criteria. Thank you.

## 2021-01-30 DIAGNOSIS — I10 ESSENTIAL HYPERTENSION: ICD-10-CM

## 2021-02-01 RX ORDER — LISINOPRIL AND HYDROCHLOROTHIAZIDE 25; 20 MG/1; MG/1
TABLET ORAL
Qty: 90 TABLET | Refills: 1 | Status: SHIPPED | OUTPATIENT
Start: 2021-02-01 | End: 2021-08-02

## 2021-02-01 RX ORDER — POTASSIUM CHLORIDE 750 MG/1
TABLET, FILM COATED, EXTENDED RELEASE ORAL
Qty: 90 TABLET | Refills: 1 | Status: SHIPPED | OUTPATIENT
Start: 2021-02-01 | End: 2021-08-02

## 2021-02-01 RX ORDER — AMLODIPINE BESYLATE 5 MG/1
TABLET ORAL
Qty: 135 TABLET | Refills: 1 | Status: SHIPPED | OUTPATIENT
Start: 2021-02-01 | End: 2021-08-02

## 2022-01-27 DIAGNOSIS — I10 ESSENTIAL HYPERTENSION: ICD-10-CM

## 2022-01-27 DIAGNOSIS — I10 ESSENTIAL HYPERTENSION: Primary | ICD-10-CM

## 2022-01-27 DIAGNOSIS — E55.9 VITAMIN D DEFICIENCY: ICD-10-CM

## 2022-01-27 RX ORDER — LISINOPRIL AND HYDROCHLOROTHIAZIDE 25; 20 MG/1; MG/1
TABLET ORAL
Qty: 90 TABLET | Refills: 3 | Status: SHIPPED | OUTPATIENT
Start: 2022-01-27

## 2022-01-27 RX ORDER — AMLODIPINE BESYLATE 5 MG/1
TABLET ORAL
Qty: 135 TABLET | Refills: 3 | Status: SHIPPED | OUTPATIENT
Start: 2022-01-27

## 2022-01-27 RX ORDER — POTASSIUM CHLORIDE 750 MG/1
TABLET, FILM COATED, EXTENDED RELEASE ORAL
Qty: 90 TABLET | Refills: 3 | Status: SHIPPED | OUTPATIENT
Start: 2022-01-27

## 2022-01-27 NOTE — TELEPHONE ENCOUNTER
When patient calls back to schedule an appointment please have him also do fasting labs before his appointment.   Thank you

## 2022-01-27 NOTE — TELEPHONE ENCOUNTER
No future appointments. LOV 12/19/19      Left message for patient to call back to set up appointment.

## 2022-04-19 ENCOUNTER — OFFICE VISIT (OUTPATIENT)
Dept: FAMILY MEDICINE CLINIC | Age: 52
End: 2022-04-19
Payer: COMMERCIAL

## 2022-04-19 VITALS
RESPIRATION RATE: 16 BRPM | BODY MASS INDEX: 33.78 KG/M2 | DIASTOLIC BLOOD PRESSURE: 84 MMHG | HEIGHT: 74 IN | WEIGHT: 263.2 LBS | SYSTOLIC BLOOD PRESSURE: 136 MMHG | TEMPERATURE: 97.1 F | HEART RATE: 72 BPM | OXYGEN SATURATION: 97 %

## 2022-04-19 DIAGNOSIS — Z12.5 PROSTATE CANCER SCREENING: ICD-10-CM

## 2022-04-19 DIAGNOSIS — H92.13 EAR DRAINAGE, BILATERAL: ICD-10-CM

## 2022-04-19 DIAGNOSIS — K21.9 GASTROESOPHAGEAL REFLUX DISEASE, UNSPECIFIED WHETHER ESOPHAGITIS PRESENT: Primary | ICD-10-CM

## 2022-04-19 PROCEDURE — 99213 OFFICE O/P EST LOW 20 MIN: CPT | Performed by: FAMILY MEDICINE

## 2022-04-19 RX ORDER — AZELASTINE 1 MG/ML
1 SPRAY, METERED NASAL 2 TIMES DAILY
Qty: 60 ML | Refills: 1 | Status: SHIPPED | OUTPATIENT
Start: 2022-04-19

## 2022-04-19 SDOH — ECONOMIC STABILITY: FOOD INSECURITY: WITHIN THE PAST 12 MONTHS, YOU WORRIED THAT YOUR FOOD WOULD RUN OUT BEFORE YOU GOT MONEY TO BUY MORE.: NEVER TRUE

## 2022-04-19 SDOH — ECONOMIC STABILITY: FOOD INSECURITY: WITHIN THE PAST 12 MONTHS, THE FOOD YOU BOUGHT JUST DIDN'T LAST AND YOU DIDN'T HAVE MONEY TO GET MORE.: NEVER TRUE

## 2022-04-19 ASSESSMENT — PATIENT HEALTH QUESTIONNAIRE - PHQ9
SUM OF ALL RESPONSES TO PHQ QUESTIONS 1-9: 0
SUM OF ALL RESPONSES TO PHQ9 QUESTIONS 1 & 2: 0
1. LITTLE INTEREST OR PLEASURE IN DOING THINGS: 0
SUM OF ALL RESPONSES TO PHQ QUESTIONS 1-9: 0
2. FEELING DOWN, DEPRESSED OR HOPELESS: 0

## 2022-04-19 ASSESSMENT — ENCOUNTER SYMPTOMS: TROUBLE SWALLOWING: 0

## 2022-04-19 ASSESSMENT — SOCIAL DETERMINANTS OF HEALTH (SDOH): HOW HARD IS IT FOR YOU TO PAY FOR THE VERY BASICS LIKE FOOD, HOUSING, MEDICAL CARE, AND HEATING?: NOT HARD AT ALL

## 2022-04-19 NOTE — PROGRESS NOTES
2022    This is a 54 y.o. male   Chief Complaint   Patient presents with    Follow-up     medication refills    . HPI  Pt presents today for medication refills    HTN: Taking Amlodipine 5 mg (1 1/2 tabs daily) and Lisinopril-HCTZ 20-25 mg, BP at home avf 130/80, denies dizziness or HA's. GERD: Taking OTC Omeprazole 20 mg, would take for 2 weeks and feel good fo awhile, now cannot go more than 34 days without it. Denies difficulty swallowing. Labs ordered on 22 have not yet been done. Also admits to clear/slighty yellowish drainage from both ears, has seasonal allergies, used Flonase 3 weeks ago for a week. Denies ear pain. Happens this time of year for the last 2 years.    Past Medical History:   Diagnosis Date    Allergic rhinitis     Chronic back pain     Hypertension     Osteoarthritis        Past Surgical History:   Procedure Laterality Date    EYE SURGERY      VASECTOMY         Social History     Socioeconomic History    Marital status:      Spouse name: Not on file    Number of children: Not on file    Years of education: Not on file    Highest education level: Not on file   Occupational History    Not on file   Tobacco Use    Smoking status: Former Smoker     Packs/day: 1.00     Years: 20.00     Pack years: 20.00     Types: Cigarettes     Quit date:      Years since quittin.3    Smokeless tobacco: Never Used   Vaping Use    Vaping Use: Never used   Substance and Sexual Activity    Alcohol use: Yes     Comment: 10 beers    Drug use: No    Sexual activity: Yes     Partners: Female   Other Topics Concern    Not on file   Social History Narrative    Not on file     Social Determinants of Health     Financial Resource Strain: Low Risk     Difficulty of Paying Living Expenses: Not hard at all   Food Insecurity: No Food Insecurity    Worried About [address] in the Last Year: Never true    [facility] in the Last Year: Never true Transportation Needs:     Lack of Transportation (Medical): Not on file    Lack of Transportation (Non-Medical): Not on file   Physical Activity:     Days of Exercise per Week: Not on file    Minutes of Exercise per Session: Not on file   Stress:     Feeling of Stress : Not on file   Social Connections:     Frequency of Communication with Friends and Family: Not on file    Frequency of Social Gatherings with Friends and Family: Not on file    Attends [employer] Services: Not on file    Active Member of [address] [place] [address] or Organizations: Not on file    Attends Club or Organization Meetings: Not on file    Marital Status: Not on file   Intimate Partner Violence:     Fear of Current or Ex-Partner: Not on file    Emotionally Abused: Not on file    Physically Abused: Not on file    Sexually Abused: Not on file   Housing Stability:     Unable to Pay for Housing in the Last Year: Not on file    Number of [place] in the Last Year: Not on file    Unstable Housing in the Last Year: Not on file       Family History   Problem Relation Age of Onset    High Blood Pressure Mother     Other Father        Current Outpatient Medications   Medication Sig Dispense Refill    OMEPRAZOLE PO Take by mouth daily      azelastine (ASTELIN) 0.1 % nasal spray 1 spray by Nasal route 2 times daily Use in each nostril as directed 60 mL 1    amLODIPine (NORVASC) 5 MG tablet TAKE ONE AND ONE-HALF TABLETS DAILY 135 tablet 3    lisinopril-hydroCHLOROthiazide (PRINZIDE;ZESTORETIC) 20-25 MG per tablet TAKE 1 TABLET DAILY 90 tablet 3    potassium chloride (KLOR-CON) 10 MEQ extended release tablet TAKE 1 TABLET DAILY 90 tablet 3     No current facility-administered medications for this visit.        Immunization History   Administered Date(s) Administered    Tdap (Boostrix, Adacel) 05/21/2019       Allergies   Allergen Reactions    No Known Allergies        Orders Only on 02/20/2019   Component Date Value Ref Range Status    Sodium 02/20/2019 141  136 - 145 mmol/L Final    Potassium 02/20/2019 4.5  3.5 - 5.1 mmol/L Final    Chloride 02/20/2019 100  99 - 110 mmol/L Final    CO2 02/20/2019 29  21 - 32 mmol/L Final    Anion Gap 02/20/2019 12  3 - 16 Final    Glucose 02/20/2019 87  70 - 99 mg/dL Final    BUN 02/20/2019 17  7 - 20 mg/dL Final    CREATININE 02/20/2019 1.1  0.9 - 1.3 mg/dL Final    GFR Non- 02/20/2019 >60  >60 Final    Comment: >60 mL/min/1.73m2 EGFR, calc. for ages 25 and older using the  MDRD formula (not corrected for weight), is valid for stable  renal function.  GFR  02/20/2019 >60  >60 Final    Comment: Chronic Kidney Disease: less than 60 ml/min/1.73 sq.m. Kidney Failure: less than 15 ml/min/1.73 sq.m. Results valid for patients 18 years and older.  Calcium 02/20/2019 10.1  8.3 - 10.6 mg/dL Final       Review of Systems   HENT: Positive for ear discharge. Negative for ear pain and trouble swallowing. Gastrointestinal:        Positive for worsening relux   Allergic/Immunologic: Positive for environmental allergies. Neurological: Negative for dizziness and headaches. /84 (Site: Left Upper Arm, Position: Sitting, Cuff Size: Large Adult)   Pulse 72   Temp 97.1 °F (36.2 °C)   Resp 16   Ht 6' 2\" (1.88 m)   Wt 263 lb 3.2 oz (119.4 kg)   SpO2 97%   BMI 33.79 kg/m²     Physical Exam  Vitals reviewed. Constitutional:       Appearance: Normal appearance. He is well-developed. HENT:      Head: Normocephalic and atraumatic. Right Ear: Ear canal and external ear normal. There is no impacted cerumen. Left Ear: Ear canal and external ear normal. There is no impacted cerumen. Ears:      Comments: Bilateral bulging of TM's with erythema  Eyes:      Pupils: Pupils are equal, round, and reactive to light. Cardiovascular:      Rate and Rhythm: Normal rate and regular rhythm. Heart sounds: Normal heart sounds.  No murmur

## 2022-06-16 DIAGNOSIS — E55.9 VITAMIN D DEFICIENCY: ICD-10-CM

## 2022-06-16 DIAGNOSIS — Z12.5 PROSTATE CANCER SCREENING: ICD-10-CM

## 2022-06-16 DIAGNOSIS — I10 ESSENTIAL HYPERTENSION: ICD-10-CM

## 2022-06-16 LAB
A/G RATIO: 1.9 (ref 1.1–2.2)
ALBUMIN SERPL-MCNC: 4.8 G/DL (ref 3.4–5)
ALP BLD-CCNC: 71 U/L (ref 40–129)
ALT SERPL-CCNC: 101 U/L (ref 10–40)
ANION GAP SERPL CALCULATED.3IONS-SCNC: 10 MMOL/L (ref 3–16)
AST SERPL-CCNC: 72 U/L (ref 15–37)
BASOPHILS ABSOLUTE: 0 K/UL (ref 0–0.2)
BASOPHILS RELATIVE PERCENT: 0.6 %
BILIRUB SERPL-MCNC: 1.3 MG/DL (ref 0–1)
BUN BLDV-MCNC: 13 MG/DL (ref 7–20)
CALCIUM SERPL-MCNC: 10 MG/DL (ref 8.3–10.6)
CHLORIDE BLD-SCNC: 100 MMOL/L (ref 99–110)
CHOLESTEROL, TOTAL: 225 MG/DL (ref 0–199)
CO2: 30 MMOL/L (ref 21–32)
CREAT SERPL-MCNC: 1.1 MG/DL (ref 0.9–1.3)
EOSINOPHILS ABSOLUTE: 0.2 K/UL (ref 0–0.6)
EOSINOPHILS RELATIVE PERCENT: 3.9 %
GFR AFRICAN AMERICAN: >60
GFR NON-AFRICAN AMERICAN: >60
GLUCOSE BLD-MCNC: 85 MG/DL (ref 70–99)
HCT VFR BLD CALC: 44.9 % (ref 40.5–52.5)
HDLC SERPL-MCNC: 40 MG/DL (ref 40–60)
HEMOGLOBIN: 15.9 G/DL (ref 13.5–17.5)
LDL CHOLESTEROL CALCULATED: 147 MG/DL
LYMPHOCYTES ABSOLUTE: 1.7 K/UL (ref 1–5.1)
LYMPHOCYTES RELATIVE PERCENT: 34.2 %
MAGNESIUM: 2.2 MG/DL (ref 1.8–2.4)
MCH RBC QN AUTO: 35.1 PG (ref 26–34)
MCHC RBC AUTO-ENTMCNC: 35.5 G/DL (ref 31–36)
MCV RBC AUTO: 99 FL (ref 80–100)
MONOCYTES ABSOLUTE: 0.5 K/UL (ref 0–1.3)
MONOCYTES RELATIVE PERCENT: 9.2 %
NEUTROPHILS ABSOLUTE: 2.6 K/UL (ref 1.7–7.7)
NEUTROPHILS RELATIVE PERCENT: 52.1 %
PDW BLD-RTO: 13.1 % (ref 12.4–15.4)
PLATELET # BLD: 208 K/UL (ref 135–450)
PMV BLD AUTO: 8.4 FL (ref 5–10.5)
POTASSIUM SERPL-SCNC: 4.1 MMOL/L (ref 3.5–5.1)
PROSTATE SPECIFIC ANTIGEN: 0.78 NG/ML (ref 0–4)
RBC # BLD: 4.53 M/UL (ref 4.2–5.9)
SODIUM BLD-SCNC: 140 MMOL/L (ref 136–145)
TOTAL PROTEIN: 7.3 G/DL (ref 6.4–8.2)
TRIGL SERPL-MCNC: 189 MG/DL (ref 0–150)
TSH SERPL DL<=0.05 MIU/L-ACNC: 1.37 UIU/ML (ref 0.27–4.2)
VITAMIN D 25-HYDROXY: 21.9 NG/ML
VLDLC SERPL CALC-MCNC: 38 MG/DL
WBC # BLD: 5.1 K/UL (ref 4–11)

## 2022-06-20 NOTE — PROGRESS NOTES
Keely Forde  YOB: 1970    Date of Service:  6/21/2022    Chief Complaint:   Keely Forde is a 54 y.o. male who presents for complete physical examination. HPI:  HPI  Labs done on June 16, 2022 reviewed at today's visit, within normal normal limits except for total cholesterol 225, , triglycerides 189, elevated liver enzymes, and vitamin D 21.9. 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    Seatbelt use: yes  Are You a Spiritual person: yes  Living will: yes    Wt Readings from Last 3 Encounters:   06/21/22 263 lb 9.6 oz (119.6 kg)   04/19/22 263 lb 3.2 oz (119.4 kg)   12/19/19 258 lb (117 kg)     BP Readings from Last 3 Encounters:   06/21/22 130/86   04/19/22 136/84   12/19/19 133/82       Patient Active Problem List   Diagnosis    Essential hypertension    Vitamin D deficiency       Health Maintenance   Topic Date Due    COVID-19 Vaccine (1) Never done    HIV screen  Never done    Hepatitis C screen  Never done    Colorectal Cancer Screen  Never done    Flu vaccine (Season Ended) 09/01/2022    Depression Screen  04/19/2023    Lipids  06/16/2027    DTaP/Tdap/Td vaccine (2 - Td or Tdap) 05/21/2029    Hepatitis A vaccine  Aged Out    Hepatitis B vaccine  Aged Out    Hib vaccine  Aged Out    Meningococcal (ACWY) vaccine  Aged Out    Pneumococcal 0-64 years Vaccine  Aged [employer] History   Administered Date(s) Administered    Tdap (Boostrix, Adacel) 05/21/2019       Allergies   Allergen Reactions    No Known Allergies      Outpatient Medications Marked as Taking for the 6/21/22 encounter (Office Visit) with Kim Hamilton, DO   Medication Sig Dispense Refill    ergocalciferol (DRISDOL) 1.25 MG (65094 UT) capsule Take 1 capsule by mouth once a week for 12 doses 4 capsule 2    OMEPRAZOLE PO Take by mouth daily      azelastine (ASTELIN) 0.1 % nasal spray 1 spray by Nasal route 2 times daily Use in each nostril as directed 60 mL 1    amLODIPine (NORVASC) 5 MG tablet TAKE ONE AND ONE-HALF TABLETS DAILY 135 tablet 3    lisinopril-hydroCHLOROthiazide (PRINZIDE;ZESTORETIC) 20-25 MG per tablet TAKE 1 TABLET DAILY 90 tablet 3    potassium chloride (KLOR-CON) 10 MEQ extended release tablet TAKE 1 TABLET DAILY 90 tablet 3       Past Medical History:   Diagnosis Date    Allergic rhinitis     Chronic back pain     Hypertension     Osteoarthritis      Past Surgical History:   Procedure Laterality Date    EYE SURGERY      VASECTOMY  2013     Family History   Problem Relation Age of Onset    High Blood Pressure Mother     Other Father      Social History     Socioeconomic History    Marital status:      Spouse name: Not on file    Number of children: Not on file    Years of education: Not on file    Highest education level: Not on file   Occupational History    Not on file   Tobacco Use    Smoking status: Former Smoker     Packs/day: 1.00     Years: 20.00     Pack years: 20.00     Types: Cigarettes     Quit date:      Years since quittin.4    Smokeless tobacco: Never Used   Vaping Use    Vaping Use: Never used   Substance and Sexual Activity    Alcohol use: Yes     Comment: 10 beers    Drug use: No    Sexual activity: Yes     Partners: Female   Other Topics Concern    Not on file   Social History Narrative    Not on file     Social Determinants of Health     Financial Resource Strain: Low Risk     Difficulty of Paying Living Expenses: Not hard at all   Food Insecurity: No Food Insecurity    Worried About Running Out of Food in the Last Year: Never true    Ann-Marie of Food in the Last Year: Never true   Transportation Needs:     Lack of Transportation (Medical): Not on file    Lack of Transportation (Non-Medical):  Not on file   Physical Activity:     Days of Exercise per Week: Not on file    Minutes of Exercise per Session: Not on file   Stress:     Feeling of Stress : Not on file   Social Connections:     Frequency of Communication with Friends and Family: Not on file    Frequency of Social Gatherings with Friends and Family: Not on file    Attends [employer] Services: Not on file    Active Member of Clubs or Organizations: Not on file    Attends Club or Organization Meetings: Not on file    Marital Status: Not on file   Intimate Partner Violence:     Fear of Current or Ex-Partner: Not on file    Emotionally Abused: Not on file    Physically Abused: Not on file    Sexually Abused: Not on file   Housing Stability:     Unable to Pay for Housing in the Last Year: Not on file    Number of [place] in the Last Year: Not on file    Unstable Housing in the Last Year: Not on file       Review ofSystems:  Review of Systems   Constitutional: Negative for fatigue. HENT: Negative for congestion. Eyes: Negative for visual disturbance. Respiratory: Negative for shortness of breath. Cardiovascular: Negative for chest pain. Gastrointestinal: Negative for abdominal pain. Periodic reflux if he doesn't take Prilosec. Endocrine: Negative for cold intolerance and heat intolerance. Genitourinary: Negative for difficulty urinating. Musculoskeletal: Negative for back pain and neck pain. Skin: Negative for color change. Allergic/Immunologic: Positive for environmental allergies. Neurological: Negative for dizziness and headaches. Hematological: Does not bruise/bleed easily. Psychiatric/Behavioral: Negative for dysphoric mood. The patient is not nervous/anxious.         PhysicalExam:   Vitals:    06/21/22 1613   BP: 130/86   Site: Left Upper Arm   Position: Sitting   Cuff Size: Large Adult   Pulse: 86   Resp: 16   Temp: 96.9 °F (36.1 °C)   TempSrc: Temporal   SpO2: 97%   Weight: 263 lb 9.6 oz (119.6 kg)   Height: 6' 3\" (1.905 m)     Body mass index is 32.95 kg/m². Physical Exam  Vitals reviewed. Constitutional:       Appearance: Normal appearance. He is well-developed. HENT:      Head: Normocephalic and atraumatic. Right Ear: Tympanic membrane and external ear normal.      Left Ear: Tympanic membrane and external ear normal.      Nose: Nose normal.   Eyes:      Pupils: Pupils are equal, round, and reactive to light. Cardiovascular:      Rate and Rhythm: Normal rate and regular rhythm. Heart sounds: Normal heart sounds. No murmur heard. Pulmonary:      Effort: Pulmonary effort is normal.      Breath sounds: Normal breath sounds. No wheezing. Abdominal:      General: Bowel sounds are normal.      Palpations: Abdomen is soft. Tenderness: There is no abdominal tenderness. Musculoskeletal:         General: Normal range of motion. Cervical back: Normal range of motion. No rigidity or tenderness. Comments: Bilateral UE/LE normal ROM   Skin:     General: Skin is warm and dry. Neurological:      Mental Status: He is alert and oriented to person, place, and time. Cranial Nerves: No cranial nerve deficit. Sensory: No sensory deficit. Motor: No weakness. Coordination: Coordination normal.      Gait: Gait normal.      Deep Tendon Reflexes: Reflexes are normal and symmetric. Reflexes normal.   Psychiatric:         Behavior: Behavior normal.         Thought Content: Thought content normal.         Judgment: Judgment normal.         Assessment/Plan:   Diagnosis Orders   1. Annual physical exam     2. Elevated LFTs  Hepatic Function Panel   3. Mixed hyperlipidemia  Lipid Panel   4. Vitamin D deficiency  ergocalciferol (DRISDOL) 1.25 MG (85941 UT) capsule    Vitamin D 25 Hydroxy     Return in about 6 months (around 12/21/2022) for HTN F/U. Prior to Visit Medications    Medication Sig Taking?  Authorizing Provider   ergocalciferol (DRISDOL) 1.25 MG (23390 UT) capsule Take 1 capsule by mouth once a week for 12 doses Yes Byron Meth, DO   OMEPRAZOLE PO Take by mouth daily Yes Historical Provider, MD   azelastine (ASTELIN) 0.1 % nasal spray 1 spray by Nasal route 2 times daily Use in each nostril as directed Yes Byron Meth, DO   amLODIPine (NORVASC) 5 MG tablet TAKE ONE AND ONE-HALF TABLETS DAILY Yes Byron Meth, DO   lisinopril-hydroCHLOROthiazide (PRINZIDE;ZESTORETIC) 20-25 MG per tablet TAKE 1 TABLET DAILY Yes Byron Meth, DO   potassium chloride (KLOR-CON) 10 MEQ extended release tablet TAKE 1 TABLET DAILY Yes Byron Meth, DO   potassium chloride (KLOR-CON) 10 MEQ extended release tablet TAKE 1 TABLET BY MOUTH EVERY DAY  Darlina Snellen Krempasky, DO   amLODIPine (NORVASC) 5 MG tablet TAKE 1 AND A HALF TABS EVERY DAY  Darlina Snellen Krempasky, DO   lisinopril-hydroCHLOROthiazide (PRINZIDE;ZESTORETIC) 20-25 MG per tablet TAKE 1 TABLET BY MOUTH EVERY DAY  Byron Meth, DO

## 2022-06-21 ENCOUNTER — OFFICE VISIT (OUTPATIENT)
Dept: FAMILY MEDICINE CLINIC | Age: 52
End: 2022-06-21
Payer: COMMERCIAL

## 2022-06-21 VITALS
SYSTOLIC BLOOD PRESSURE: 130 MMHG | HEIGHT: 75 IN | RESPIRATION RATE: 16 BRPM | WEIGHT: 263.6 LBS | BODY MASS INDEX: 32.78 KG/M2 | TEMPERATURE: 96.9 F | OXYGEN SATURATION: 97 % | HEART RATE: 86 BPM | DIASTOLIC BLOOD PRESSURE: 86 MMHG

## 2022-06-21 DIAGNOSIS — E55.9 VITAMIN D DEFICIENCY: ICD-10-CM

## 2022-06-21 DIAGNOSIS — Z00.00 ANNUAL PHYSICAL EXAM: Primary | ICD-10-CM

## 2022-06-21 DIAGNOSIS — E78.2 MIXED HYPERLIPIDEMIA: ICD-10-CM

## 2022-06-21 DIAGNOSIS — R79.89 ELEVATED LFTS: ICD-10-CM

## 2022-06-21 PROCEDURE — 99396 PREV VISIT EST AGE 40-64: CPT | Performed by: FAMILY MEDICINE

## 2022-06-21 RX ORDER — ERGOCALCIFEROL (VITAMIN D2) 1250 MCG
50000 CAPSULE ORAL WEEKLY
Qty: 4 CAPSULE | Refills: 2 | Status: SHIPPED | OUTPATIENT
Start: 2022-06-21 | End: 2022-09-07

## 2022-06-21 ASSESSMENT — ENCOUNTER SYMPTOMS
ABDOMINAL PAIN: 0
COLOR CHANGE: 0
BACK PAIN: 0
SHORTNESS OF BREATH: 0

## 2022-06-21 ASSESSMENT — PATIENT HEALTH QUESTIONNAIRE - PHQ9
SUM OF ALL RESPONSES TO PHQ QUESTIONS 1-9: 0
SUM OF ALL RESPONSES TO PHQ QUESTIONS 1-9: 0
SUM OF ALL RESPONSES TO PHQ9 QUESTIONS 1 & 2: 0
1. LITTLE INTEREST OR PLEASURE IN DOING THINGS: 0
SUM OF ALL RESPONSES TO PHQ QUESTIONS 1-9: 0
SUM OF ALL RESPONSES TO PHQ QUESTIONS 1-9: 0
2. FEELING DOWN, DEPRESSED OR HOPELESS: 0

## 2022-12-29 ENCOUNTER — TELEPHONE (OUTPATIENT)
Dept: FAMILY MEDICINE CLINIC | Age: 52
End: 2022-12-29

## 2023-01-23 DIAGNOSIS — I10 ESSENTIAL HYPERTENSION: ICD-10-CM

## 2023-01-24 RX ORDER — POTASSIUM CHLORIDE 750 MG/1
TABLET, FILM COATED, EXTENDED RELEASE ORAL
Qty: 90 TABLET | Refills: 1 | Status: SHIPPED | OUTPATIENT
Start: 2023-01-24

## 2023-01-24 RX ORDER — LISINOPRIL AND HYDROCHLOROTHIAZIDE 25; 20 MG/1; MG/1
TABLET ORAL
Qty: 90 TABLET | Refills: 1 | Status: SHIPPED | OUTPATIENT
Start: 2023-01-24

## 2023-01-24 RX ORDER — AMLODIPINE BESYLATE 5 MG/1
TABLET ORAL
Qty: 135 TABLET | Refills: 1 | Status: SHIPPED | OUTPATIENT
Start: 2023-01-24

## 2023-07-21 DIAGNOSIS — I10 ESSENTIAL HYPERTENSION: ICD-10-CM

## 2023-07-21 DIAGNOSIS — R79.89 ELEVATED LFTS: Primary | ICD-10-CM

## 2023-07-21 DIAGNOSIS — E55.9 VITAMIN D DEFICIENCY: ICD-10-CM

## 2023-07-21 DIAGNOSIS — E78.2 MIXED HYPERLIPIDEMIA: ICD-10-CM

## 2023-07-21 DIAGNOSIS — Z12.5 PROSTATE CANCER SCREENING: ICD-10-CM

## 2023-07-21 RX ORDER — LISINOPRIL AND HYDROCHLOROTHIAZIDE 25; 20 MG/1; MG/1
TABLET ORAL
Qty: 90 TABLET | Refills: 1 | Status: SHIPPED | OUTPATIENT
Start: 2023-07-21

## 2023-07-21 RX ORDER — POTASSIUM CHLORIDE 750 MG/1
TABLET, FILM COATED, EXTENDED RELEASE ORAL
Qty: 90 TABLET | Refills: 1 | Status: SHIPPED | OUTPATIENT
Start: 2023-07-21

## 2023-07-21 RX ORDER — AMLODIPINE BESYLATE 5 MG/1
TABLET ORAL
Qty: 135 TABLET | Refills: 1 | Status: SHIPPED | OUTPATIENT
Start: 2023-07-21

## 2024-01-10 ASSESSMENT — PATIENT HEALTH QUESTIONNAIRE - PHQ9
2. FEELING DOWN, DEPRESSED OR HOPELESS: 0
1. LITTLE INTEREST OR PLEASURE IN DOING THINGS: NOT AT ALL
SUM OF ALL RESPONSES TO PHQ QUESTIONS 1-9: 0
SUM OF ALL RESPONSES TO PHQ QUESTIONS 1-9: 0
2. FEELING DOWN, DEPRESSED OR HOPELESS: NOT AT ALL
SUM OF ALL RESPONSES TO PHQ QUESTIONS 1-9: 0
1. LITTLE INTEREST OR PLEASURE IN DOING THINGS: 0
SUM OF ALL RESPONSES TO PHQ QUESTIONS 1-9: 0
SUM OF ALL RESPONSES TO PHQ9 QUESTIONS 1 & 2: 0
SUM OF ALL RESPONSES TO PHQ9 QUESTIONS 1 & 2: 0

## 2024-01-11 ENCOUNTER — OFFICE VISIT (OUTPATIENT)
Dept: FAMILY MEDICINE CLINIC | Age: 54
End: 2024-01-11
Payer: COMMERCIAL

## 2024-01-11 VITALS
HEIGHT: 74 IN | WEIGHT: 264.2 LBS | SYSTOLIC BLOOD PRESSURE: 133 MMHG | BODY MASS INDEX: 33.91 KG/M2 | DIASTOLIC BLOOD PRESSURE: 88 MMHG | RESPIRATION RATE: 16 BRPM | OXYGEN SATURATION: 97 % | TEMPERATURE: 97.1 F | HEART RATE: 69 BPM

## 2024-01-11 DIAGNOSIS — Z12.12 SCREENING FOR COLORECTAL CANCER: ICD-10-CM

## 2024-01-11 DIAGNOSIS — R14.3 FLATULENCE: ICD-10-CM

## 2024-01-11 DIAGNOSIS — R79.89 ELEVATED LFTS: ICD-10-CM

## 2024-01-11 DIAGNOSIS — Z12.5 PROSTATE CANCER SCREENING: ICD-10-CM

## 2024-01-11 DIAGNOSIS — R29.898 LEFT ARM WEAKNESS: ICD-10-CM

## 2024-01-11 DIAGNOSIS — Z00.00 ANNUAL PHYSICAL EXAM: Primary | ICD-10-CM

## 2024-01-11 DIAGNOSIS — E78.2 MIXED HYPERLIPIDEMIA: ICD-10-CM

## 2024-01-11 DIAGNOSIS — E55.9 VITAMIN D DEFICIENCY: ICD-10-CM

## 2024-01-11 DIAGNOSIS — R14.0 ABDOMINAL BLOATING: ICD-10-CM

## 2024-01-11 DIAGNOSIS — Z12.11 SCREENING FOR COLORECTAL CANCER: ICD-10-CM

## 2024-01-11 DIAGNOSIS — I10 ESSENTIAL HYPERTENSION: ICD-10-CM

## 2024-01-11 PROCEDURE — 3079F DIAST BP 80-89 MM HG: CPT | Performed by: FAMILY MEDICINE

## 2024-01-11 PROCEDURE — 99396 PREV VISIT EST AGE 40-64: CPT | Performed by: FAMILY MEDICINE

## 2024-01-11 PROCEDURE — 3075F SYST BP GE 130 - 139MM HG: CPT | Performed by: FAMILY MEDICINE

## 2024-01-11 SDOH — ECONOMIC STABILITY: FOOD INSECURITY: WITHIN THE PAST 12 MONTHS, YOU WORRIED THAT YOUR FOOD WOULD RUN OUT BEFORE YOU GOT MONEY TO BUY MORE.: NEVER TRUE

## 2024-01-11 SDOH — ECONOMIC STABILITY: INCOME INSECURITY: HOW HARD IS IT FOR YOU TO PAY FOR THE VERY BASICS LIKE FOOD, HOUSING, MEDICAL CARE, AND HEATING?: NOT HARD AT ALL

## 2024-01-11 SDOH — ECONOMIC STABILITY: FOOD INSECURITY: WITHIN THE PAST 12 MONTHS, THE FOOD YOU BOUGHT JUST DIDN'T LAST AND YOU DIDN'T HAVE MONEY TO GET MORE.: NEVER TRUE

## 2024-01-11 SDOH — ECONOMIC STABILITY: HOUSING INSECURITY
IN THE LAST 12 MONTHS, WAS THERE A TIME WHEN YOU DID NOT HAVE A STEADY PLACE TO SLEEP OR SLEPT IN A SHELTER (INCLUDING NOW)?: NO

## 2024-01-11 ASSESSMENT — ENCOUNTER SYMPTOMS
COLOR CHANGE: 0
SHORTNESS OF BREATH: 0

## 2024-01-11 NOTE — PROGRESS NOTES
Reji Oden  YOB: 1970    Date of Service:  1/11/2024    Chief Complaint:   Reji Oden is a 53 y.o. male who presents for complete physical examination.    HPI:  HPI    Self-testicular exams: Yes  Sexual activity: yes   Last eye exam: 2 years ago ,normal  Exercise: Walks a lot at work, works around his house and his parents place  Seatbelt use: yes  Are You a Spiritual person: yes  Living will: yes    Wt Readings from Last 3 Encounters:   01/11/24 119.8 kg (264 lb 3.2 oz)   06/21/22 119.6 kg (263 lb 9.6 oz)   04/19/22 119.4 kg (263 lb 3.2 oz)     BP Readings from Last 3 Encounters:   01/11/24 133/88   06/21/22 130/86   04/19/22 136/84       Patient Active Problem List   Diagnosis    Essential hypertension    Vitamin D deficiency       Health Maintenance   Topic Date Due    Hepatitis B vaccine (1 of 3 - 3-dose series) Never done    COVID-19 Vaccine (1) Never done    HIV screen  Never done    Hepatitis C screen  Never done    Colorectal Cancer Screen  Never done    Shingles vaccine (1 of 2) Never done    Flu vaccine (1) Never done    Depression Screen  01/10/2025    Lipids  06/16/2027    DTaP/Tdap/Td vaccine (2 - Td or Tdap) 05/21/2029    Hepatitis A vaccine  Aged Out    Hib vaccine  Aged Out    Polio vaccine  Aged Out    Meningococcal (ACWY) vaccine  Aged Out    Pneumococcal 0-64 years Vaccine  Aged Out       Immunization History   Administered Date(s) Administered    TDaP, ADACEL (age 10y-64y), BOOSTRIX (age 10y+), IM, 0.5mL 05/21/2019       Allergies   Allergen Reactions    No Known Allergies      Outpatient Medications Marked as Taking for the 1/11/24 encounter (Office Visit) with Tres Mazariegos, DO   Medication Sig Dispense Refill    amLODIPine (NORVASC) 5 MG tablet TAKE ONE AND ONE-HALF TABLETS DAILY 135 tablet 1    potassium chloride (KLOR-CON) 10 MEQ extended release tablet TAKE 1 TABLET DAILY 90 tablet 1    lisinopril-hydroCHLOROthiazide (PRINZIDE;ZESTORETIC) 20-25 MG per tablet TAKE 1

## 2024-03-05 DIAGNOSIS — I10 ESSENTIAL HYPERTENSION: ICD-10-CM

## 2024-03-05 RX ORDER — POTASSIUM CHLORIDE 750 MG/1
10 TABLET, FILM COATED, EXTENDED RELEASE ORAL DAILY
Qty: 90 TABLET | Refills: 1 | Status: SHIPPED | OUTPATIENT
Start: 2024-03-05

## 2024-03-05 RX ORDER — LISINOPRIL AND HYDROCHLOROTHIAZIDE 25; 20 MG/1; MG/1
1 TABLET ORAL DAILY
Qty: 90 TABLET | Refills: 1 | Status: SHIPPED | OUTPATIENT
Start: 2024-03-05

## 2024-03-05 RX ORDER — AMLODIPINE BESYLATE 5 MG/1
7.5 TABLET ORAL DAILY
Qty: 135 TABLET | Refills: 1 | Status: SHIPPED | OUTPATIENT
Start: 2024-03-05

## 2024-03-05 SDOH — HEALTH STABILITY: PHYSICAL HEALTH: ON AVERAGE, HOW MANY MINUTES DO YOU ENGAGE IN EXERCISE AT THIS LEVEL?: 40 MIN

## 2024-03-05 SDOH — HEALTH STABILITY: PHYSICAL HEALTH: ON AVERAGE, HOW MANY DAYS PER WEEK DO YOU ENGAGE IN MODERATE TO STRENUOUS EXERCISE (LIKE A BRISK WALK)?: 2 DAYS

## 2024-03-05 NOTE — TELEPHONE ENCOUNTER
Future Appointments   Date Time Provider Department Center   3/6/2024 10:30 AM Ravi Rausch MD [facility]   7/12/2024  8:00 AM Tres Mazariegos, DO [facility]     LOV 1/11/2024

## 2024-03-06 ENCOUNTER — OFFICE VISIT (OUTPATIENT)
Dept: ORTHOPEDIC SURGERY | Age: 54
End: 2024-03-06
Payer: COMMERCIAL

## 2024-03-06 VITALS — RESPIRATION RATE: 18 BRPM | BODY MASS INDEX: 33.88 KG/M2 | HEIGHT: 74 IN | WEIGHT: 264 LBS

## 2024-03-06 DIAGNOSIS — R29.898 ARM WEAKNESS: Primary | ICD-10-CM

## 2024-03-06 PROCEDURE — 99203 OFFICE O/P NEW LOW 30 MIN: CPT | Performed by: PHYSICIAN ASSISTANT

## 2024-03-06 NOTE — PROGRESS NOTES
discussion and a reasonable understating of the options available to him, Mr. Reji Oden has selected to proceed with a conservative plan of treatment consisting of: attention to elbow positioning and pressure,  activity modification, and the judicious use of over-the-counter anti-inflammatory medications if allowed by his primary care physician.  Instructions were given regarding the use of other modalities as appropriate.   Mr. Reji Oden  voiced an appropriate understanding of our discussion, the options available to him, and of the expectations of his selected  treatment.    He is specifically instructed to contact the office between now & his scheduled appointment if he has concerns related to his condition.  He is welcome to call for an appointment sooner if he has any additional concerns or questions.      I will ask Mr. Reji Oden. to undergo electrodiagnostic studies of the symptomatic right side.  II have also given him a card for Dr. Campbell for follow up after his EMG. I explained to him that anything going on at the triceps does not come from a nerve issue related to the median or ulnar nerve and that his symptoms are likely due to something going on at his shoulder and/or neck.       Mr. Reji Oden has been given a full verbal list of instructions and precautions related to his present condition.  I have asked him to followup with me in the office at the prescribed time. He is also specifically requested to call or return to the office sooner if his symptoms change or worsen prior to the next scheduled appointment.

## 2024-04-22 NOTE — PROGRESS NOTES
Reji Akshat    Age 53 y.o.    male    1970    MRN [id number]    4/26/2024  Arrival Time_____________  OR Time____________60 Min     Procedure(s):  COLONOSCOPY  ESOPHAGOGASTRODUODENOSCOPY                      General    Surgeon(s):  Cassius Landry Valentino, DO       Phone [phone] (home) [phone] (work)    [facility]  Date  Info Source  Home  Cell         Work  _____________________________________________________________________  _____________________________________________________________________  _____________________________________________________________________  _____________________________________________________________________  _____________________________________________________________________    PCP _____________________________ Phone_________________     H&P  ________________  Bringing      Chart              Epic      DOS      Called________  EKG ________________   Bringing      Chart              Epic      DOS      Called________  LABS________________   Bringing     Chart              Epic      DOS      Called________  Cardiac Clearance ______ Bringing      Chart              Epic      DOS      Called________  Pulmonary Clearance____ Bringing      Chart              Epic      DOS      Called________    Cardiologist________________________ Phone___________________________  Pulmonologist_______________________Phone___________________________    ? Advance Directives   ? Confucianism concerns / Waiver on Chart            PAT Communications________________  ? Pre-op Instructions Given /Understood          _________________________________  ? Directions to Surgery Center                          _________________________________  ? Transportation Home_______________      __________________________________  ? Crutches/Walker__________________        __________________________________    Orders: Hard copy/ EPIC                 Transcribed/ EPIC              _______Wt.    ________Pharmacy                          _______SCD                      ______DAHIANA MOHR    COVID + _______DATE    ___OK per Anesthesia   ____OK per Surgeon

## 2024-04-25 NOTE — PROGRESS NOTES
surgery  To provide excellent care visitors will be limited to two per room at any given time. No visitors under the age of 14.  If you use oxygen and have a portable tank please bring it with you the DOS  For your convenience [facility] has a pharmacy on site to fill your prescriptions.     *Please call pre admission testing if you have any further questions             Hang      [phone]                            Address: [address] [place] [address]     When you pull into the hospital and are looking at the main hospital entrance, turn right.   We are a tan building to the right of the main entrance.   [facility] over the door.  .                                                        Revision History

## 2024-04-26 ENCOUNTER — ANESTHESIA (OUTPATIENT)
Dept: ENDOSCOPY | Age: 54
End: 2024-04-26
Payer: COMMERCIAL

## 2024-04-26 ENCOUNTER — ANESTHESIA EVENT (OUTPATIENT)
Dept: ENDOSCOPY | Age: 54
End: 2024-04-26
Payer: COMMERCIAL

## 2024-04-26 ENCOUNTER — HOSPITAL ENCOUNTER (OUTPATIENT)
Age: 54
Setting detail: OUTPATIENT SURGERY
Discharge: HOME | End: 2024-04-26
Attending: INTERNAL MEDICINE | Admitting: INTERNAL MEDICINE
Payer: COMMERCIAL

## 2024-04-26 VITALS
RESPIRATION RATE: 16 BRPM | TEMPERATURE: 97 F | SYSTOLIC BLOOD PRESSURE: 146 MMHG | HEART RATE: 63 BPM | DIASTOLIC BLOOD PRESSURE: 87 MMHG | OXYGEN SATURATION: 98 %

## 2024-04-26 DIAGNOSIS — R13.14 DYSPHAGIA, PHARYNGOESOPHAGEAL PHASE: ICD-10-CM

## 2024-04-26 DIAGNOSIS — Z80.0 FAMILY HISTORY OF COLON CANCER: ICD-10-CM

## 2024-04-26 PROCEDURE — 2580000003 HC RX 258: Performed by: ANESTHESIOLOGY

## 2024-04-26 PROCEDURE — 3609012400 HC EGD TRANSORAL BIOPSY SINGLE/MULTIPLE: Performed by: INTERNAL MEDICINE

## 2024-04-26 PROCEDURE — 3700000000 HC ANESTHESIA ATTENDED CARE: Performed by: INTERNAL MEDICINE

## 2024-04-26 PROCEDURE — 6360000002 HC RX W HCPCS: Performed by: NURSE ANESTHETIST, CERTIFIED REGISTERED

## 2024-04-26 PROCEDURE — 2709999900 HC NON-CHARGEABLE SUPPLY: Performed by: INTERNAL MEDICINE

## 2024-04-26 PROCEDURE — 88305 TISSUE EXAM BY PATHOLOGIST: CPT

## 2024-04-26 PROCEDURE — 7100000010 HC PHASE II RECOVERY - FIRST 15 MIN: Performed by: INTERNAL MEDICINE

## 2024-04-26 PROCEDURE — 2500000003 HC RX 250 WO HCPCS: Performed by: NURSE ANESTHETIST, CERTIFIED REGISTERED

## 2024-04-26 PROCEDURE — 3609027000 HC COLONOSCOPY: Performed by: INTERNAL MEDICINE

## 2024-04-26 PROCEDURE — 3700000001 HC ADD 15 MINUTES (ANESTHESIA): Performed by: INTERNAL MEDICINE

## 2024-04-26 PROCEDURE — 7100000011 HC PHASE II RECOVERY - ADDTL 15 MIN: Performed by: INTERNAL MEDICINE

## 2024-04-26 RX ORDER — SODIUM CHLORIDE, SODIUM LACTATE, POTASSIUM CHLORIDE, CALCIUM CHLORIDE 600; 310; 30; 20 MG/100ML; MG/100ML; MG/100ML; MG/100ML
INJECTION, SOLUTION INTRAVENOUS CONTINUOUS
Status: DISCONTINUED | OUTPATIENT
Start: 2024-04-26 | End: 2024-04-26 | Stop reason: HOSPADM

## 2024-04-26 RX ORDER — PROPOFOL 10 MG/ML
INJECTION, EMULSION INTRAVENOUS PRN
Status: DISCONTINUED | OUTPATIENT
Start: 2024-04-26 | End: 2024-04-26 | Stop reason: SDUPTHER

## 2024-04-26 RX ORDER — LIDOCAINE HYDROCHLORIDE 10 MG/ML
1 INJECTION, SOLUTION EPIDURAL; INFILTRATION; INTRACAUDAL; PERINEURAL
Status: DISCONTINUED | OUTPATIENT
Start: 2024-04-26 | End: 2024-04-26 | Stop reason: HOSPADM

## 2024-04-26 RX ORDER — LIDOCAINE HYDROCHLORIDE 20 MG/ML
INJECTION, SOLUTION EPIDURAL; INFILTRATION; INTRACAUDAL; PERINEURAL PRN
Status: DISCONTINUED | OUTPATIENT
Start: 2024-04-26 | End: 2024-04-26 | Stop reason: SDUPTHER

## 2024-04-26 RX ORDER — PROPOFOL 10 MG/ML
INJECTION, EMULSION INTRAVENOUS CONTINUOUS PRN
Status: DISCONTINUED | OUTPATIENT
Start: 2024-04-26 | End: 2024-04-26 | Stop reason: SDUPTHER

## 2024-04-26 RX ORDER — SODIUM CHLORIDE 9 MG/ML
INJECTION, SOLUTION INTRAVENOUS PRN
Status: DISCONTINUED | OUTPATIENT
Start: 2024-04-26 | End: 2024-04-26 | Stop reason: HOSPADM

## 2024-04-26 RX ORDER — SODIUM CHLORIDE 0.9 % (FLUSH) 0.9 %
5-40 SYRINGE (ML) INJECTION PRN
Status: DISCONTINUED | OUTPATIENT
Start: 2024-04-26 | End: 2024-04-26 | Stop reason: HOSPADM

## 2024-04-26 RX ORDER — SODIUM CHLORIDE 0.9 % (FLUSH) 0.9 %
5-40 SYRINGE (ML) INJECTION EVERY 12 HOURS SCHEDULED
Status: DISCONTINUED | OUTPATIENT
Start: 2024-04-26 | End: 2024-04-26 | Stop reason: HOSPADM

## 2024-04-26 RX ADMIN — PROPOFOL 150 MCG/KG/MIN: 10 INJECTION, EMULSION INTRAVENOUS at 14:45

## 2024-04-26 RX ADMIN — LIDOCAINE HYDROCHLORIDE 50 MG: 20 INJECTION, SOLUTION EPIDURAL; INFILTRATION; INTRACAUDAL; PERINEURAL at 14:45

## 2024-04-26 RX ADMIN — PROPOFOL 50 MG: 10 INJECTION, EMULSION INTRAVENOUS at 14:48

## 2024-04-26 RX ADMIN — PROPOFOL 100 MG: 10 INJECTION, EMULSION INTRAVENOUS at 14:45

## 2024-04-26 RX ADMIN — SODIUM CHLORIDE, POTASSIUM CHLORIDE, SODIUM LACTATE AND CALCIUM CHLORIDE: 600; 310; 30; 20 INJECTION, SOLUTION INTRAVENOUS at 14:25

## 2024-04-26 ASSESSMENT — PAIN SCALES - GENERAL
PAINLEVEL_OUTOF10: 0

## 2024-04-26 ASSESSMENT — PAIN - FUNCTIONAL ASSESSMENT: PAIN_FUNCTIONAL_ASSESSMENT: 0-10

## 2024-04-26 NOTE — H&P
(PRINZIDE;ZESTORETIC) 20-25 MG per tablet Take 1 tablet by mouth daily 3/5/24   Tres Mazariegos, DO   ergocalciferol (DRISDOL) 1.25 MG (35377 UT) capsule Take 1 capsule by mouth once a week for 12 doses 6/21/22 1/11/24  Tres Mazariegos DO   OMEPRAZOLE PO Take by mouth daily    Provider, MD Shine   azelastine (ASTELIN) 0.1 % nasal spray 1 spray by Nasal route 2 times daily Use in each nostril as directed 4/19/22   Tres Mazariegos DO       Review of Systems:  Weight Loss: No  Dysphagia: No  Dyspepsia: No    Physical Exam:   Vital Signs: There were no vitals taken for this visit.  Vital signs reviewed:Yes    HEENT:Normal  Cardiac:Normal  Chest:Normal  Abdomen:Normal  Exts: Normal  Neuro:Normal    Labs:  No visits with results within 12 Week(s) from this visit.   Latest known visit with results is:   Orders Only on 06/16/2022   Component Date Value Ref Range Status    PSA 06/16/2022 0.78  0.00 - 4.00 ng/mL Final    Magnesium 06/16/2022 2.20  1.80 - 2.40 mg/dL Final    Vit D, 25-Hydroxy 06/16/2022 21.9 (L)  >=30 ng/mL Final    Sodium 06/16/2022 140  136 - 145 mmol/L Final    Potassium 06/16/2022 4.1  3.5 - 5.1 mmol/L Final    Chloride 06/16/2022 100  99 - 110 mmol/L Final    CO2 06/16/2022 30  21 - 32 mmol/L Final    Anion Gap 06/16/2022 10  3 - 16 Final    Glucose 06/16/2022 85  70 - 99 mg/dL Final    BUN 06/16/2022 13  7 - 20 mg/dL Final    Creatinine 06/16/2022 1.1  0.9 - 1.3 mg/dL Final    GFR Non- 06/16/2022 >60  >60 Final    GFR  06/16/2022 >60  >60 Final    Calcium 06/16/2022 10.0  8.3 - 10.6 mg/dL Final    Total Protein 06/16/2022 7.3  6.4 - 8.2 g/dL Final    Albumin 06/16/2022 4.8  3.4 - 5.0 g/dL Final    Albumin/Globulin Ratio 06/16/2022 1.9  1.1 - 2.2 Final    Total Bilirubin 06/16/2022 1.3 (H)  0.0 - 1.0 mg/dL Final    Alkaline Phosphatase 06/16/2022 71  40 - 129 U/L Final    ALT 06/16/2022 101 (H)  10 - 40 U/L Final    AST 06/16/2022 72 (H)  15 - 37 U/L Final     WBC 06/16/2022 5.1  4.0 - 11.0 K/uL Final    RBC 06/16/2022 4.53  4.20 - 5.90 M/uL Final    Hemoglobin 06/16/2022 15.9  13.5 - 17.5 g/dL Final    Hematocrit 06/16/2022 44.9  40.5 - 52.5 % Final    MCV 06/16/2022 99.0  80.0 - 100.0 fL Final    MCH 06/16/2022 35.1 (H)  26.0 - 34.0 pg Final    MCHC 06/16/2022 35.5  31.0 - 36.0 g/dL Final    RDW 06/16/2022 13.1  12.4 - 15.4 % Final    Platelets 06/16/2022 208  135 - 450 K/uL Final    MPV 06/16/2022 8.4  5.0 - 10.5 fL Final    Neutrophils % 06/16/2022 52.1  % Final    Lymphocytes % 06/16/2022 34.2  % Final    Monocytes % 06/16/2022 9.2  % Final    Eosinophils % 06/16/2022 3.9  % Final    Basophils % 06/16/2022 0.6  % Final    Neutrophils Absolute 06/16/2022 2.6  1.7 - 7.7 K/uL Final    Lymphocytes Absolute 06/16/2022 1.7  1.0 - 5.1 K/uL Final    Monocytes Absolute 06/16/2022 0.5  0.0 - 1.3 K/uL Final    Eosinophils Absolute 06/16/2022 0.2  0.0 - 0.6 K/uL Final    Basophils Absolute 06/16/2022 0.0  0.0 - 0.2 K/uL Final    Cholesterol, Total 06/16/2022 225 (H)  0 - 199 mg/dL Final    Triglycerides 06/16/2022 189 (H)  0 - 150 mg/dL Final    HDL 06/16/2022 40  40 - 60 mg/dL Final    LDL Calculated 06/16/2022 147 (H)  <100 mg/dL Final    VLDL Cholesterol Calculated 06/16/2022 38  Not Established mg/dL Final    TSH 06/16/2022 1.37  0.27 - 4.20 uIU/mL Final        Imaging:  No orders to display       ASA:2    Mallampati Score: III    Sedation planned:MAc    Patient in acceptable condition for procedure:Yes    11:17 AM 4/26/2024    Cassius Landry, DO      Please note that some or all of this record was generated using voice recognition software. If there are any questions about the content of this document, please contact the author as some errors in transcription may have occurred.    The patient and I discussed that this is an elective procedure/surgery. We discussed the risks of the procedure/surgery, including but not limited to what is outlined in the signed informed

## 2024-04-26 NOTE — ANESTHESIA PRE PROCEDURE
• No Known Allergies        Problem List:    Patient Active Problem List   Diagnosis Code   • Essential hypertension I10   • Vitamin D deficiency E55.9       Past Medical History:        Diagnosis Date   • Allergic rhinitis    • Chronic back pain    • Hypertension    • Osteoarthritis        Past Surgical History:        Procedure Laterality Date   • EYE SURGERY     • VASECTOMY         Social History:    Social History     Tobacco Use   • Smoking status: Former     Current packs/day: 0.00     Average packs/day: 1 pack/day for 20.0 years (20.0 ttl pk-yrs)     Types: Cigarettes     Start date:      Quit date:      Years since quittin.3   • Smokeless tobacco: Never   Substance Use Topics   • Alcohol use: Yes     Alcohol/week: 4.0 standard drinks of alcohol     Types: 4 Cans of beer per week     Comment: Drinking has become habitual                                Counseling given: Not Answered      Vital Signs (Current): There were no vitals filed for this visit.                                           BP Readings from Last 3 Encounters:   24 133/88   22 130/86   22 136/84       NPO Status:                                                                                 BMI:   Wt Readings from Last 3 Encounters:   24 119.7 kg (264 lb)   24 119.8 kg (264 lb 3.2 oz)   22 119.6 kg (263 lb 9.6 oz)     There is no height or weight on file to calculate BMI.    CBC:   Lab Results   Component Value Date/Time    WBC 5.1 2022 10:18 AM    RBC 4.53 2022 10:18 AM    HGB 15.9 2022 10:18 AM    HCT 44.9 2022 10:18 AM    MCV 99.0 2022 10:18 AM    RDW 13.1 2022 10:18 AM     2022 10:18 AM       CMP:   Lab Results   Component Value Date/Time     2022 10:18 AM    K 4.1 2022 10:18 AM     2022 10:18 AM    CO2 30 2022 10:18 AM    BUN 13 2022 10:18 AM    CREATININE 1.1 2022 10:18 AM    GFRAA >60

## 2024-04-26 NOTE — ANESTHESIA POSTPROCEDURE EVALUATION
Department of Anesthesiology  Postprocedure Note    Patient: Reji Oden  MRN: [id number]  YOB: 1970  Date of evaluation: 4/26/2024    Procedure Summary       Date: 04/26/24 Room / Location: [place] [address] / [facility]    Anesthesia Start: 1442 Anesthesia Stop: 1514    Procedures:       COLONOSCOPY      ESOPHAGOGASTRODUODENOSCOPY BIOPSY Diagnosis:       Family history of colon cancer      Dysphagia, pharyngoesophageal phase      (Family history of colon cancer [Z80.0])      (Dysphagia, pharyngoesophageal phase [R13.14])    Surgeons: Cassius Landry DO Responsible Provider: Tres Sterling MD    Anesthesia Type: general ASA Status: 2            Anesthesia Type: No value filed.    Van Phase I: Van Score: 10    Van Phase II: Van Score: 9    Anesthesia Post Evaluation    Patient location during evaluation: PACU  Patient participation: complete - patient participated  Level of consciousness: awake and alert  Airway patency: patent  Nausea & Vomiting: no nausea and no vomiting  Cardiovascular status: hemodynamically stable  Respiratory status: acceptable  Hydration status: euvolemic  Pain management: adequate    No notable events documented.

## 2024-04-26 NOTE — PROGRESS NOTES
Pt up to the bathroom to void without difficulty. Discharge instructions given to pt and family. Verbalized understanding. PIV removed. Pt dressed and walked out and discharged to the care of their family in stable condition.

## 2024-08-11 DIAGNOSIS — I10 ESSENTIAL HYPERTENSION: ICD-10-CM

## 2024-08-12 RX ORDER — POTASSIUM CHLORIDE 750 MG/1
10 TABLET, FILM COATED, EXTENDED RELEASE ORAL DAILY
Qty: 90 TABLET | Refills: 1 | Status: SHIPPED | OUTPATIENT
Start: 2024-08-12

## 2024-08-12 RX ORDER — LISINOPRIL AND HYDROCHLOROTHIAZIDE 25; 20 MG/1; MG/1
1 TABLET ORAL DAILY
Qty: 90 TABLET | Refills: 1 | Status: SHIPPED | OUTPATIENT
Start: 2024-08-12

## 2024-08-12 RX ORDER — AMLODIPINE BESYLATE 5 MG/1
TABLET ORAL
Qty: 135 TABLET | Refills: 1 | Status: SHIPPED | OUTPATIENT
Start: 2024-08-12

## 2024-08-12 NOTE — TELEPHONE ENCOUNTER
Future Appointments   Date Time Provider Department Center   8/16/2024  8:00 AM Tres Mazariegos DO [facility]     LOV 1/11/2024

## 2024-08-16 ENCOUNTER — TELEMEDICINE (OUTPATIENT)
Dept: FAMILY MEDICINE CLINIC | Age: 54
End: 2024-08-16
Payer: COMMERCIAL

## 2024-08-16 DIAGNOSIS — E78.2 MIXED HYPERLIPIDEMIA: ICD-10-CM

## 2024-08-16 DIAGNOSIS — Z12.5 PROSTATE CANCER SCREENING: ICD-10-CM

## 2024-08-16 DIAGNOSIS — I10 ESSENTIAL HYPERTENSION: Primary | ICD-10-CM

## 2024-08-16 DIAGNOSIS — E55.9 VITAMIN D DEFICIENCY: ICD-10-CM

## 2024-08-16 PROCEDURE — 99213 OFFICE O/P EST LOW 20 MIN: CPT | Performed by: FAMILY MEDICINE

## 2024-08-16 NOTE — ASSESSMENT & PLAN NOTE
Well-controlled, continue current medications    Orders:    TSH with Reflex; Future    CBC with Auto Differential; Future    Potassium; Future

## 2025-03-11 DIAGNOSIS — I10 ESSENTIAL HYPERTENSION: ICD-10-CM

## 2025-03-11 RX ORDER — AMLODIPINE BESYLATE 5 MG/1
TABLET ORAL
Qty: 135 TABLET | Refills: 1 | Status: SHIPPED | OUTPATIENT
Start: 2025-03-11

## 2025-03-11 RX ORDER — LISINOPRIL AND HYDROCHLOROTHIAZIDE 20; 25 MG/1; MG/1
1 TABLET ORAL DAILY
Qty: 90 TABLET | Refills: 1 | Status: SHIPPED | OUTPATIENT
Start: 2025-03-11

## 2025-03-11 RX ORDER — POTASSIUM CHLORIDE 750 MG/1
10 TABLET, EXTENDED RELEASE ORAL DAILY
Qty: 90 TABLET | Refills: 1 | Status: SHIPPED | OUTPATIENT
Start: 2025-03-11

## (undated) DEVICE — FORCEPS BX L240CM JAW DIA2.8MM L CAP W/ NDL MIC MESH TOOTH

## (undated) DEVICE — CONMED SCOPE SAVER BITE BLOCK, 20X27 MM: Brand: SCOPE SAVER

## (undated) DEVICE — ELECTRODE,ECG,STRESS,FOAM,3PK: Brand: MEDLINE

## (undated) DEVICE — CANNULA NSL AD TBNG L7FT PVC STR NONFLARED PRNG O2 DEL W STD

## (undated) DEVICE — ENDOSCOPIC KIT 2 12 FT OP4 DE2 GWN SYR